# Patient Record
Sex: FEMALE | Race: WHITE
[De-identification: names, ages, dates, MRNs, and addresses within clinical notes are randomized per-mention and may not be internally consistent; named-entity substitution may affect disease eponyms.]

---

## 2017-07-19 ENCOUNTER — APPOINTMENT (OUTPATIENT)
Dept: VASCULAR SURGERY | Facility: CLINIC | Age: 72
End: 2017-07-19

## 2017-11-01 ENCOUNTER — APPOINTMENT (OUTPATIENT)
Dept: VASCULAR SURGERY | Facility: CLINIC | Age: 72
End: 2017-11-01
Payer: MEDICARE

## 2017-11-01 VITALS — DIASTOLIC BLOOD PRESSURE: 77 MMHG | SYSTOLIC BLOOD PRESSURE: 137 MMHG

## 2017-11-01 PROCEDURE — 99213 OFFICE O/P EST LOW 20 MIN: CPT | Mod: 25

## 2017-11-01 PROCEDURE — 93880 EXTRACRANIAL BILAT STUDY: CPT

## 2017-11-19 ENCOUNTER — FORM ENCOUNTER (OUTPATIENT)
Age: 72
End: 2017-11-19

## 2017-11-20 ENCOUNTER — OUTPATIENT (OUTPATIENT)
Dept: OUTPATIENT SERVICES | Facility: HOSPITAL | Age: 72
LOS: 1 days | End: 2017-11-20
Payer: MEDICARE

## 2017-11-20 DIAGNOSIS — Z98.51 TUBAL LIGATION STATUS: Chronic | ICD-10-CM

## 2017-11-20 DIAGNOSIS — Z98.89 OTHER SPECIFIED POSTPROCEDURAL STATES: Chronic | ICD-10-CM

## 2017-11-20 PROCEDURE — 71275 CT ANGIOGRAPHY CHEST: CPT | Mod: 26

## 2017-11-20 PROCEDURE — 71275 CT ANGIOGRAPHY CHEST: CPT

## 2017-11-20 PROCEDURE — 70498 CT ANGIOGRAPHY NECK: CPT

## 2017-11-20 PROCEDURE — 70498 CT ANGIOGRAPHY NECK: CPT | Mod: 26

## 2017-12-12 ENCOUNTER — INPATIENT (INPATIENT)
Facility: HOSPITAL | Age: 72
LOS: 6 days | Discharge: HOME | DRG: 35 | End: 2017-12-19
Attending: SURGERY | Admitting: SURGERY
Payer: MEDICARE

## 2017-12-12 ENCOUNTER — APPOINTMENT (OUTPATIENT)
Dept: VASCULAR SURGERY | Facility: HOSPITAL | Age: 72
End: 2017-12-12

## 2017-12-12 VITALS
OXYGEN SATURATION: 100 % | SYSTOLIC BLOOD PRESSURE: 242 MMHG | RESPIRATION RATE: 18 BRPM | HEART RATE: 114 BPM | HEIGHT: 62.5 IN | WEIGHT: 119.71 LBS | TEMPERATURE: 98 F | DIASTOLIC BLOOD PRESSURE: 108 MMHG

## 2017-12-12 DIAGNOSIS — Z98.89 OTHER SPECIFIED POSTPROCEDURAL STATES: Chronic | ICD-10-CM

## 2017-12-12 DIAGNOSIS — Z98.51 TUBAL LIGATION STATUS: Chronic | ICD-10-CM

## 2017-12-12 DIAGNOSIS — Z98.890 OTHER SPECIFIED POSTPROCEDURAL STATES: Chronic | ICD-10-CM

## 2017-12-12 LAB
ANION GAP SERPL CALC-SCNC: 12 MMOL/L — SIGNIFICANT CHANGE UP (ref 5–17)
APTT BLD: 28.4 SEC — SIGNIFICANT CHANGE UP (ref 27.5–37.4)
BUN SERPL-MCNC: 22 MG/DL — SIGNIFICANT CHANGE UP (ref 7–23)
CALCIUM SERPL-MCNC: 9.5 MG/DL — SIGNIFICANT CHANGE UP (ref 8.4–10.5)
CHLORIDE SERPL-SCNC: 106 MMOL/L — SIGNIFICANT CHANGE UP (ref 96–108)
CO2 SERPL-SCNC: 22 MMOL/L — SIGNIFICANT CHANGE UP (ref 22–31)
CREAT SERPL-MCNC: 0.57 MG/DL — SIGNIFICANT CHANGE UP (ref 0.5–1.3)
GLUCOSE BLDC GLUCOMTR-MCNC: 107 MG/DL — HIGH (ref 70–99)
GLUCOSE BLDC GLUCOMTR-MCNC: 87 MG/DL — SIGNIFICANT CHANGE UP (ref 70–99)
GLUCOSE SERPL-MCNC: 97 MG/DL — SIGNIFICANT CHANGE UP (ref 70–99)
HCT VFR BLD CALC: 41.4 % — SIGNIFICANT CHANGE UP (ref 34.5–45)
HGB BLD-MCNC: 13.8 G/DL — SIGNIFICANT CHANGE UP (ref 11.5–15.5)
INR BLD: 0.97 — SIGNIFICANT CHANGE UP (ref 0.88–1.16)
MCHC RBC-ENTMCNC: 28.3 PG — SIGNIFICANT CHANGE UP (ref 27–34)
MCHC RBC-ENTMCNC: 33.3 G/DL — SIGNIFICANT CHANGE UP (ref 32–36)
MCV RBC AUTO: 84.8 FL — SIGNIFICANT CHANGE UP (ref 80–100)
PLATELET # BLD AUTO: 377 K/UL — SIGNIFICANT CHANGE UP (ref 150–400)
POTASSIUM SERPL-MCNC: 4.3 MMOL/L — SIGNIFICANT CHANGE UP (ref 3.5–5.3)
POTASSIUM SERPL-SCNC: 4.3 MMOL/L — SIGNIFICANT CHANGE UP (ref 3.5–5.3)
PROTHROM AB SERPL-ACNC: 10.8 SEC — SIGNIFICANT CHANGE UP (ref 9.8–12.7)
RBC # BLD: 4.88 M/UL — SIGNIFICANT CHANGE UP (ref 3.8–5.2)
RBC # FLD: 12.7 % — SIGNIFICANT CHANGE UP (ref 10.3–16.9)
SODIUM SERPL-SCNC: 140 MMOL/L — SIGNIFICANT CHANGE UP (ref 135–145)
TROPONIN T SERPL-MCNC: <0.01 NG/ML — SIGNIFICANT CHANGE UP (ref 0–0.01)
WBC # BLD: 16.8 K/UL — HIGH (ref 3.8–10.5)
WBC # FLD AUTO: 16.8 K/UL — HIGH (ref 3.8–10.5)

## 2017-12-12 PROCEDURE — 99282 EMERGENCY DEPT VISIT SF MDM: CPT | Mod: GC

## 2017-12-12 PROCEDURE — 93010 ELECTROCARDIOGRAM REPORT: CPT

## 2017-12-12 PROCEDURE — 99291 CRITICAL CARE FIRST HOUR: CPT

## 2017-12-12 PROCEDURE — 70496 CT ANGIOGRAPHY HEAD: CPT | Mod: 26

## 2017-12-12 PROCEDURE — 70498 CT ANGIOGRAPHY NECK: CPT | Mod: 26

## 2017-12-12 PROCEDURE — 75605 CONTRAST EXAM THORACIC AORTA: CPT | Mod: 26,59,GC

## 2017-12-12 PROCEDURE — 36200 PLACE CATHETER IN AORTA: CPT | Mod: 59,GC

## 2017-12-12 PROCEDURE — 36215 PLACE CATHETER IN ARTERY: CPT | Mod: 59,GC

## 2017-12-12 PROCEDURE — 37218 STENT PLACEMT ANTE CAROTID: CPT | Mod: LT,GC

## 2017-12-12 RX ORDER — INSULIN LISPRO 100/ML
VIAL (ML) SUBCUTANEOUS
Qty: 0 | Refills: 0 | Status: DISCONTINUED | OUTPATIENT
Start: 2017-12-12 | End: 2017-12-19

## 2017-12-12 RX ORDER — GLUCAGON INJECTION, SOLUTION 0.5 MG/.1ML
1 INJECTION, SOLUTION SUBCUTANEOUS ONCE
Qty: 0 | Refills: 0 | Status: DISCONTINUED | OUTPATIENT
Start: 2017-12-12 | End: 2017-12-19

## 2017-12-12 RX ORDER — DEXTROSE 50 % IN WATER 50 %
1 SYRINGE (ML) INTRAVENOUS ONCE
Qty: 0 | Refills: 0 | Status: DISCONTINUED | OUTPATIENT
Start: 2017-12-12 | End: 2017-12-19

## 2017-12-12 RX ORDER — HYDRALAZINE HCL 50 MG
10 TABLET ORAL ONCE
Qty: 0 | Refills: 0 | Status: COMPLETED | OUTPATIENT
Start: 2017-12-12 | End: 2017-12-12

## 2017-12-12 RX ORDER — AMLODIPINE BESYLATE 2.5 MG/1
5 TABLET ORAL DAILY
Qty: 0 | Refills: 0 | Status: DISCONTINUED | OUTPATIENT
Start: 2017-12-12 | End: 2017-12-19

## 2017-12-12 RX ORDER — LABETALOL HCL 100 MG
20 TABLET ORAL ONCE
Qty: 0 | Refills: 0 | Status: COMPLETED | OUTPATIENT
Start: 2017-12-12 | End: 2017-12-12

## 2017-12-12 RX ORDER — SODIUM CHLORIDE 9 MG/ML
1000 INJECTION, SOLUTION INTRAVENOUS
Qty: 0 | Refills: 0 | Status: DISCONTINUED | OUTPATIENT
Start: 2017-12-12 | End: 2017-12-19

## 2017-12-12 RX ORDER — CHLORHEXIDINE GLUCONATE 213 G/1000ML
1 SOLUTION TOPICAL ONCE
Qty: 0 | Refills: 0 | Status: DISCONTINUED | OUTPATIENT
Start: 2017-12-12 | End: 2017-12-14

## 2017-12-12 RX ORDER — EPTIFIBATIDE 2 MG/ML
0.5 INJECTION, SOLUTION INTRAVENOUS
Qty: 75 | Refills: 0 | Status: DISCONTINUED | OUTPATIENT
Start: 2017-12-12 | End: 2017-12-13

## 2017-12-12 RX ORDER — METOPROLOL TARTRATE 50 MG
25 TABLET ORAL
Qty: 0 | Refills: 0 | Status: DISCONTINUED | OUTPATIENT
Start: 2017-12-12 | End: 2017-12-13

## 2017-12-12 RX ORDER — LEVOTHYROXINE SODIUM 125 MCG
37.5 TABLET ORAL DAILY
Qty: 0 | Refills: 0 | Status: DISCONTINUED | OUTPATIENT
Start: 2017-12-12 | End: 2017-12-12

## 2017-12-12 RX ORDER — HEPARIN SODIUM 5000 [USP'U]/ML
5000 INJECTION INTRAVENOUS; SUBCUTANEOUS ONCE
Qty: 0 | Refills: 0 | Status: COMPLETED | OUTPATIENT
Start: 2017-12-12 | End: 2017-12-12

## 2017-12-12 RX ORDER — LEVOTHYROXINE SODIUM 125 MCG
75 TABLET ORAL DAILY
Qty: 0 | Refills: 0 | Status: DISCONTINUED | OUTPATIENT
Start: 2017-12-12 | End: 2017-12-19

## 2017-12-12 RX ORDER — CLOPIDOGREL BISULFATE 75 MG/1
300 TABLET, FILM COATED ORAL ONCE
Qty: 0 | Refills: 0 | Status: COMPLETED | OUTPATIENT
Start: 2017-12-12 | End: 2017-12-12

## 2017-12-12 RX ORDER — HEPARIN SODIUM 5000 [USP'U]/ML
1000 INJECTION INTRAVENOUS; SUBCUTANEOUS
Qty: 25000 | Refills: 0 | Status: DISCONTINUED | OUTPATIENT
Start: 2017-12-12 | End: 2017-12-12

## 2017-12-12 RX ORDER — DEXTROSE 50 % IN WATER 50 %
25 SYRINGE (ML) INTRAVENOUS ONCE
Qty: 0 | Refills: 0 | Status: DISCONTINUED | OUTPATIENT
Start: 2017-12-12 | End: 2017-12-19

## 2017-12-12 RX ORDER — EPTIFIBATIDE 2 MG/ML
0.5 INJECTION, SOLUTION INTRAVENOUS
Qty: 75 | Refills: 0 | Status: DISCONTINUED | OUTPATIENT
Start: 2017-12-12 | End: 2017-12-12

## 2017-12-12 RX ORDER — ONDANSETRON 8 MG/1
4 TABLET, FILM COATED ORAL EVERY 6 HOURS
Qty: 0 | Refills: 0 | Status: DISCONTINUED | OUTPATIENT
Start: 2017-12-12 | End: 2017-12-19

## 2017-12-12 RX ORDER — SODIUM CHLORIDE 9 MG/ML
1000 INJECTION INTRAMUSCULAR; INTRAVENOUS; SUBCUTANEOUS
Qty: 0 | Refills: 0 | Status: DISCONTINUED | OUTPATIENT
Start: 2017-12-12 | End: 2017-12-14

## 2017-12-12 RX ORDER — DEXTROSE 50 % IN WATER 50 %
12.5 SYRINGE (ML) INTRAVENOUS ONCE
Qty: 0 | Refills: 0 | Status: DISCONTINUED | OUTPATIENT
Start: 2017-12-12 | End: 2017-12-19

## 2017-12-12 RX ADMIN — AMLODIPINE BESYLATE 5 MILLIGRAM(S): 2.5 TABLET ORAL at 22:11

## 2017-12-12 RX ADMIN — Medication 10 MILLIGRAM(S): at 21:50

## 2017-12-12 RX ADMIN — CLOPIDOGREL BISULFATE 300 MILLIGRAM(S): 75 TABLET, FILM COATED ORAL at 19:34

## 2017-12-12 RX ADMIN — EPTIFIBATIDE 2.16 MICROGRAM(S)/KG/MIN: 2 INJECTION, SOLUTION INTRAVENOUS at 20:23

## 2017-12-12 RX ADMIN — SODIUM CHLORIDE 60 MILLILITER(S): 9 INJECTION INTRAMUSCULAR; INTRAVENOUS; SUBCUTANEOUS at 19:34

## 2017-12-12 RX ADMIN — Medication 20 MILLIGRAM(S): at 19:36

## 2017-12-12 RX ADMIN — HEPARIN SODIUM 5000 UNIT(S): 5000 INJECTION INTRAVENOUS; SUBCUTANEOUS at 19:37

## 2017-12-12 RX ADMIN — Medication 25 MILLIGRAM(S): at 22:11

## 2017-12-12 RX ADMIN — Medication 20 MILLIGRAM(S): at 19:32

## 2017-12-12 NOTE — PROVIDER CONTACT NOTE (CHANGE IN STATUS NOTIFICATION) - ASSESSMENT
patient unable to transfer from stretcher to bed right leg heaviness and right arm numbness, elevated Blood pressure and FS 87

## 2017-12-12 NOTE — PROVIDER CONTACT NOTE (CHANGE IN STATUS NOTIFICATION) - SITUATION
Patient having numbness of the right arm and right leg heaviness on the stretcher from the cath lab, Vascular team made aware and page  notifying Dr. Reed

## 2017-12-12 NOTE — CONSULT NOTE ADULT - SUBJECTIVE AND OBJECTIVE BOX
**STROKE CODE CONSULT NOTE**    Last known well time/Time of onset of symptoms:    HPI:    PAST MEDICAL & SURGICAL HISTORY:  Shingles  Hypothyroidism  Carotid stenosis, right  Gastritis  High cholesterol  Vitamin D deficiency  HTN (hypertension)  Carotid stenosis, left  S/P carotid endarterectomy: right: 3/2014, left: 4/2016  History of hemorrhoidectomy  History of tubal ligation      FAMILY HISTORY:  No pertinent family history in first degree relatives      SOCIAL HISTORY:  Smoking Cesation: Discussed    ROS:  Constitutional: No fever, weight loss or fatigue  Eyes: No eye pain, visual disturbances, or discharge  ENMT:  No difficulty hearing, tinnitus, vertigo; No sinus or throat pain  Neck: No pain or stiffness  Respiratory: No cough, wheezing, chills or hemoptysis  Cardiovascular: No chest pain, palpitations, shortness of breath, dizziness or leg swelling  Gastrointestinal: No abdominal pain. No nausea, vomiting or hematemesis; No diarrhea or constipation. Nohematochezia.  Genitourinary: No dysuria, frequency, hematuria or incontinence  Neurological: As per HPI  Skin: No itching, burning, rashes or lesions   Endocrine: No heat or cold intolerance; No hair loss  Musculoskeletal: No joint pain or swelling; No muscle, back or extremity pain  Psychiatric: No depression, anxiety, mood swings or difficulty sleeping  Heme/Lymph: No easy bruising or bleeding gums    MEDICATIONS  (STANDING):  amLODIPine   Tablet 5 milliGRAM(s) Oral daily  aspirin  chewable 81 milliGRAM(s) Oral daily  atorvastatin 80 milliGRAM(s) Oral at bedtime  chlorhexidine 4% Liquid 1 Application(s) Topical once  dextrose 5%. 1000 milliLiter(s) (50 mL/Hr) IV Continuous <Continuous>  dextrose 50% Injectable 12.5 Gram(s) IV Push once  dextrose 50% Injectable 25 Gram(s) IV Push once  dextrose 50% Injectable 25 Gram(s) IV Push once  insulin lispro (HumaLOG) corrective regimen sliding scale   SubCutaneous Before meals and at bedtime  levothyroxine 75 MICROGram(s) Oral daily  sodium chloride 0.9%. 1000 milliLiter(s) (60 mL/Hr) IV Continuous <Continuous>    MEDICATIONS  (PRN):  dextrose Gel 1 Dose(s) Oral once PRN Blood Glucose LESS THAN 70 milliGRAM(s)/deciliter  glucagon  Injectable 1 milliGRAM(s) IntraMuscular once PRN Glucose LESS THAN 70 milligrams/deciliter  ondansetron Injectable 4 milliGRAM(s) IV Push every 6 hours PRN Nausea and/or Vomiting      Allergies    No Known Allergies    Intolerances        Vital Signs Last 24 Hrs  T(C): 36.6 (13 Dec 2017 17:57), Max: 36.6 (12 Dec 2017 23:19)  T(F): 97.8 (13 Dec 2017 17:57), Max: 97.9 (12 Dec 2017 23:19)  HR: 72 (13 Dec 2017 20:00) (62 - 94)  BP: 149/59 (13 Dec 2017 20:00) (117/60 - 211/72)  BP(mean): 88 (13 Dec 2017 20:00) (79 - 121)  RR: 19 (13 Dec 2017 20:00) (13 - 34)  SpO2: 90% (13 Dec 2017 20:00) (90% - 100%)    PHYSICAL EXAM:  Constitutional: WDWN; NAD  Cardiovascular: RRR, no appreciable murmurs; no carotid bruits  Neurologic:  Mental status: Awake, alert and oriented x3.  Recent and remote memory intact.  Naming, repetition and comprehension intact.  Attention/concentration intact.  No dysarthria, no aphasia.  Fund of knowledge appropriate.    Cranial nerves: Fundoscopic exam demonstrated no abnormalities, pupils equally round and reactive to light, visual fields full, no nystagmus, extraocular muscles intact, V1 through V3 intact bilaterally and symmetric, face symmetric, hearing intact to finger rub, palate elevation symmetric, tongue was midline, sternocleidomastoid/shoulder shrug strength bilaterally 5/5.    Motor:  Normal bulk and tone, strength 5/5 in bilateral upper and lower extremities.   strength 5/5.  Rapid alternating movements intact and symmetric.   Sensation: Intact to light touch, proprioception, and pinprick.  No neglect.   Coordination: No dysmetria on finger-to-nose and heel-to-shin.  No clumsiness.  Reflexes: 2+ in upper and lower extremities, downgoing toes bilaterally  Gait: Narrow and steady. No ataxia.  Romberg negative    NIHSS:    Fingerstick Blood Glucose: CAPILLARY BLOOD GLUCOSE  87 (12 Dec 2017 20:24)      POCT Blood Glucose.: 241 mg/dL (13 Dec 2017 16:58)       LABS:                        12.9   13.0  )-----------( 324      ( 13 Dec 2017 06:16 )             37.4     12-13    137  |  104  |  15  ----------------------------<  97  4.2   |  17<L>  |  0.40<L>    Ca    8.7      13 Dec 2017 06:16  Phos  3.3     12-13  Mg     2.1     12-13      PT/INR - ( 13 Dec 2017 06:16 )   PT: 10.7 sec;   INR: 0.96          PTT - ( 13 Dec 2017 06:16 )  PTT:26.5 sec  CARDIAC MARKERS ( 12 Dec 2017 18:22 )  x     / <0.01 ng/mL / x     / x     / x              RADIOLOGY & ADDITIONAL STUDIES:    IV-tPA (Y/N):                                   Bolus time:  Reason IV-tPA not given:    ASSESSMENT/PLAN: **STROKE CODE CONSULT NOTE**    Last known well time/Time of onset of symptoms: 12/12 at 17:55    HPI:      PAST MEDICAL & SURGICAL HISTORY:  Shingles  Hypothyroidism  Carotid stenosis, right  Gastritis  High cholesterol  Vitamin D deficiency  HTN (hypertension)  Carotid stenosis, left  S/P carotid endarterectomy: right: 3/2014, left: 4/2016  History of hemorrhoidectomy  History of tubal ligation      FAMILY HISTORY:  No pertinent family history in first degree relatives      SOCIAL HISTORY:  Smoking Cesation: Discussed    ROS:  Constitutional: No fever, weight loss or fatigue  Eyes: No eye pain, visual disturbances, or discharge  ENMT:  No difficulty hearing, tinnitus, vertigo; No sinus or throat pain  Neck: No pain or stiffness  Respiratory: No cough, wheezing, chills or hemoptysis  Cardiovascular: No chest pain, palpitations, shortness of breath, dizziness or leg swelling  Gastrointestinal: No abdominal pain. No nausea, vomiting or hematemesis; No diarrhea or constipation. Nohematochezia.  Genitourinary: No dysuria, frequency, hematuria or incontinence  Neurological: As per HPI  Skin: No itching, burning, rashes or lesions   Endocrine: No heat or cold intolerance; No hair loss  Musculoskeletal: No joint pain or swelling; No muscle, back or extremity pain  Psychiatric: No depression, anxiety, mood swings or difficulty sleeping  Heme/Lymph: No easy bruising or bleeding gums    MEDICATIONS  (STANDING):  amLODIPine   Tablet 5 milliGRAM(s) Oral daily  aspirin  chewable 81 milliGRAM(s) Oral daily  atorvastatin 80 milliGRAM(s) Oral at bedtime  chlorhexidine 4% Liquid 1 Application(s) Topical once  dextrose 5%. 1000 milliLiter(s) (50 mL/Hr) IV Continuous <Continuous>  dextrose 50% Injectable 12.5 Gram(s) IV Push once  dextrose 50% Injectable 25 Gram(s) IV Push once  dextrose 50% Injectable 25 Gram(s) IV Push once  insulin lispro (HumaLOG) corrective regimen sliding scale   SubCutaneous Before meals and at bedtime  levothyroxine 75 MICROGram(s) Oral daily  sodium chloride 0.9%. 1000 milliLiter(s) (60 mL/Hr) IV Continuous <Continuous>    MEDICATIONS  (PRN):  dextrose Gel 1 Dose(s) Oral once PRN Blood Glucose LESS THAN 70 milliGRAM(s)/deciliter  glucagon  Injectable 1 milliGRAM(s) IntraMuscular once PRN Glucose LESS THAN 70 milligrams/deciliter  ondansetron Injectable 4 milliGRAM(s) IV Push every 6 hours PRN Nausea and/or Vomiting      Allergies    No Known Allergies    Intolerances        Vital Signs Last 24 Hrs  T(C): 36.6 (13 Dec 2017 17:57), Max: 36.6 (12 Dec 2017 23:19)  T(F): 97.8 (13 Dec 2017 17:57), Max: 97.9 (12 Dec 2017 23:19)  HR: 72 (13 Dec 2017 20:00) (62 - 94)  BP: 149/59 (13 Dec 2017 20:00) (117/60 - 211/72)  BP(mean): 88 (13 Dec 2017 20:00) (79 - 121)  RR: 19 (13 Dec 2017 20:00) (13 - 34)  SpO2: 90% (13 Dec 2017 20:00) (90% - 100%)    PHYSICAL EXAM:  Constitutional: WDWN; NAD  Cardiovascular: RRR, no appreciable murmurs; no carotid bruits  Neurologic:  Mental status: Awake, alert and oriented x3.  Recent and remote memory intact.  Naming, repetition and comprehension intact.  Attention/concentration intact.  No dysarthria, no aphasia.  Fund of knowledge appropriate.    Cranial nerves: Fundoscopic exam demonstrated no abnormalities, pupils equally round and reactive to light, visual fields full, no nystagmus, extraocular muscles intact, V1 through V3 intact bilaterally and symmetric, face symmetric, hearing intact to finger rub, palate elevation symmetric, tongue was midline, sternocleidomastoid/shoulder shrug strength bilaterally 5/5.    Motor:  Normal bulk and tone, strength 5/5 in bilateral upper and lower extremities.   strength 5/5.  Rapid alternating movements intact and symmetric.   Sensation: Intact to light touch, proprioception, and pinprick.  No neglect.   Coordination: No dysmetria on finger-to-nose and heel-to-shin.  No clumsiness.  Reflexes: 2+ in upper and lower extremities, downgoing toes bilaterally  Gait: Narrow and steady. No ataxia.  Romberg negative    NIHSS:    Fingerstick Blood Glucose: CAPILLARY BLOOD GLUCOSE  87 (12 Dec 2017 20:24)      POCT Blood Glucose.: 241 mg/dL (13 Dec 2017 16:58)       LABS:                        12.9   13.0  )-----------( 324      ( 13 Dec 2017 06:16 )             37.4     12-13    137  |  104  |  15  ----------------------------<  97  4.2   |  17<L>  |  0.40<L>    Ca    8.7      13 Dec 2017 06:16  Phos  3.3     12-13  Mg     2.1     12-13      PT/INR - ( 13 Dec 2017 06:16 )   PT: 10.7 sec;   INR: 0.96          PTT - ( 13 Dec 2017 06:16 )  PTT:26.5 sec  CARDIAC MARKERS ( 12 Dec 2017 18:22 )  x     / <0.01 ng/mL / x     / x     / x              RADIOLOGY & ADDITIONAL STUDIES:    IV-tPA (Y/N):                                   Bolus time:  Reason IV-tPA not given:    ASSESSMENT/PLAN: **STROKE CODE CONSULT NOTE**    Last known well time/Time of onset of symptoms: 12/12 at 17:55    HPI:  73 y/o F with PMHx of HTN, HLD, hypothyroidism, b/l carotid stenosis s/p R CEA (3/2014) and L CEA (4/2016), restenosis of L carotid artery s/p carotid stent 12/12, now developing right-sided hemiplegia post-procedure. Stroke code was called on 12/12 at 18:05 for acute right-sided hemiplegia. Patient states there was heaviness in her R arm and leg, preventing her from lifting. However, is able to wiggle fingers and toes. Denies loss of sensation, confusion, vision change, CP, SOB, lightheadedness, palpitations, cough, URI symptoms, N/V/D/C, abdominal pain, dysuria, hematuria, changes in bowel habits, LE edema.     PAST MEDICAL & SURGICAL HISTORY:  Shingles  Hypothyroidism  Carotid stenosis, right  Gastritis  High cholesterol  Vitamin D deficiency  HTN (hypertension)  Carotid stenosis, left  S/P carotid endarterectomy: right: 3/2014, left: 4/2016  History of hemorrhoidectomy  History of tubal ligation    MEDICATIONS  (STANDING):  amLODIPine   Tablet 5 milliGRAM(s) Oral daily  aspirin  chewable 81 milliGRAM(s) Oral daily  atorvastatin 80 milliGRAM(s) Oral at bedtime  chlorhexidine 4% Liquid 1 Application(s) Topical once  dextrose 5%. 1000 milliLiter(s) (50 mL/Hr) IV Continuous <Continuous>  dextrose 50% Injectable 12.5 Gram(s) IV Push once  dextrose 50% Injectable 25 Gram(s) IV Push once  dextrose 50% Injectable 25 Gram(s) IV Push once  insulin lispro (HumaLOG) corrective regimen sliding scale   SubCutaneous Before meals and at bedtime  levothyroxine 75 MICROGram(s) Oral daily  sodium chloride 0.9%. 1000 milliLiter(s) (60 mL/Hr) IV Continuous <Continuous>    MEDICATIONS  (PRN):  dextrose Gel 1 Dose(s) Oral once PRN Blood Glucose LESS THAN 70 milliGRAM(s)/deciliter  glucagon  Injectable 1 milliGRAM(s) IntraMuscular once PRN Glucose LESS THAN 70 milligrams/deciliter  ondansetron Injectable 4 milliGRAM(s) IV Push every 6 hours PRN Nausea and/or Vomiting      Allergies  No Known Allergies    Vital Signs Last 24 Hrs  T(C): 36.6 (12 Dec 2017 18:08), Max: 36.6 (12 Dec 2017 18:08)  T(F): 97.8 (12 Dec 2017 18:08), Max: 97.8 (12 Dec 2017 18:08)  HR: 72 (12 Dec 2017 19:56) (70 - 114)  BP: 174/80 (12 Dec 2017 19:56) (174/80 - 242/108)  BP(mean): --  RR: 18 (12 Dec 2017 19:56) (18 - 18)  SpO2: 99% (12 Dec 2017 19:56) (99% - 100%)    PHYSICAL EXAM:  Constitutional: WDWN; anxious, AOx3  Cardiovascular: RRR, no appreciable murmurs; no carotid bruits  Neurologic:  Mental status: Awake, alert and oriented x3.  Recent and remote memory intact.  Naming, repetition and comprehension intact.  Attention/concentration intact.  No dysarthria, no aphasia.  Fund of knowledge appropriate.    Cranial nerves: PERRL, visual fields full, no nystagmus, extraocular muscles intact, V1 through V3 intact bilaterally and symmetric, face symmetric, hearing intact to finger rub, palate elevation symmetric, tongue was midline, sternocleidomastoid/shoulder shrug strength bilaterally 5/5.    Motor:  Normal bulk and tone, proximal RUE 1/5, R hand  3/5, RLE 1/5. LUE and LLE 5/5. L hand  5/5. Rapid alternating movements intact and symmetric.   Sensation: Intact to light touch, proprioception, and pinprick.  No neglect.   Coordination: No dysmetria on finger-to-nose and heel-to-shin.  No clumsiness.  Reflexes: 2+ in upper and lower extremities, downgoing toes bilaterally  Gait: Narrow and steady. No ataxia.  Romberg negative    NIHSS: 6    Fingerstick Blood Glucose: CAPILLARY BLOOD GLUCOSE  87 (12 Dec 2017 20:24)     LABS:                        13.8   16.8  )-----------( 377      ( 12 Dec 2017 18:22 )             41.4     12-12    140  |  106  |  22  ----------------------------<  97  4.3   |  22  |  0.57    Ca    9.5      12 Dec 2017 18:22      PT/INR - ( 12 Dec 2017 18:22 )   PT: 10.8 sec;   INR: 0.97          PTT - ( 12 Dec 2017 18:22 )  PTT:28.4 sec    RADIOLOGY & ADDITIONAL STUDIES:    IV-tPA (Y/N):  N                            Reason IV-tPA not given: patient given heparin bolus at 18:05.

## 2017-12-12 NOTE — H&P ADULT - PSH
History of hemorrhoidectomy    History of tubal ligation    S/P carotid endarterectomy  right: 3/2014, left: 4/2016

## 2017-12-12 NOTE — CONSULT NOTE ADULT - ASSESSMENT
73 y/o F with PMHx of b/l carotid stenosis s/p R CEA (3/2014) and L CEA (4/2016), restenosis of L carotid artery s/p carotid stent 12/12, now developing right-sided hemiplegia post-procedure.     1. Right sided hemiplegia: Symptom onset post L carotid stent placement. Stroke code called, CT head negative for transcortical infarct or hemorrhage. CTA head/neck negative for stenosis, new L carotid stent patent post-procedure. tPA was not pushed as patient was given heparin bolus at time of symptom onset.  - Start integrelin gtt.  - Continue with plavix. Recommend aspirin 81 mg qd.  - Recommend atorvastatin 80mg qhs.  - Consider MRI Brain to assess for acute stroke.  - PT/OT evaluation 73 y/o F with PMHx of b/l carotid stenosis s/p R CEA (3/2014) and L CEA (4/2016), restenosis of L carotid artery s/p carotid stent 12/12, now developing right-sided hemiplegia post-procedure.     1. Right sided hemiplegia: Symptom onset post L carotid stent placement. Stroke code called, CT head negative for transcortical infarct or hemorrhage. CTA head/neck negative for stenosis, new L carotid stent patent post-procedure. tPA was not pushed as patient was given heparin bolus at time of symptom onset.  - Start integrelin gtt.  - Continue with plavix. Recommend aspirin 81 mg qd.  - Recommend atorvastatin 80mg qhs.  - Consider MRI Brain to assess for acute stroke.  - PT/OT evaluation    Case discussed with Dr. Reed, stroke attending, and with primary team.

## 2017-12-12 NOTE — H&P ADULT - PMH
Carotid stenosis, left    Carotid stenosis, right    Gastritis    High cholesterol    HTN (hypertension)    Hypothyroidism    Shingles    Vitamin D deficiency

## 2017-12-12 NOTE — H&P ADULT - ASSESSMENT
71 yo F with restenosis of left carotid s/p L CEA in 4/2016.    Plan:  - cath lab today for left carotid stent

## 2017-12-12 NOTE — H&P ADULT - NSHPPHYSICALEXAM_GEN_ALL_CORE
Pre op:  Gen: NAD, resting comfortably  HEENT: NC/AT, EOMI  Lungs: normal resp effort  Heart: NSR  Abd: soft, NT/ND  Extr: 5/5 strength x 4, no clubbing/cyanosis/edema  Neuro: A/O x 3, normal motor/sensation, CNs II-XII grossly intact, no deficits  skin: no rashes or lesions  Psych: normal

## 2017-12-12 NOTE — PROGRESS NOTE ADULT - SUBJECTIVE AND OBJECTIVE BOX
Vascular Surgery Post-Op Note    Procedure: L internal Carotid Stent     Diagnosis/Indication: L ICA stenosis     Surgeon: Dr Gloria    S: Pt has no complaints. Denies CP, SOB, calf tenderness.     O:  Vital Signs Last 24 Hrs  T(C): --  T(F): --  HR: --  BP: --  BP(mean): --  RR: --  SpO2: --T(C): --  T(F): --  HR: --  BP: --  RR: --  SpO2: --  Wt(kg): --    Pre Sheath Pull    Gen: NAD, resting comfortably in bed  C/V: NSR  Pulm: Nonlabored breathing, no respiratory distress  Abd: soft, NT/ND  Groin: no active bleeding, soft no hematoma, no ecchymosis,    Extrem: WWP, No edema, no wounds. 2+ PT BP. Biphasic DP BL Motor and sensory intact BL  Neuro: CN 2-12 intact. = sensation and motor BL.       Post Sheath Pull    Gen: NAD, resting comfortably in bed  C/V: NSR  Pulm: Nonlabored breathing, no respiratory distress  Abd: soft, NT/ND  Groin: no active bleeding, soft no hematoma, no ecchymosis,    Extrem: WWP, No edema, no wounds. 2+ PT BP. Biphasic DP BL Motor and sensory intact BL  Neuro: CN 2-12 intact. = sensation and motor BL.       A/P: 72yFemale s/p above procedure. 6 F Sheath pulled from R groin, direct pressure held for 20 minutes. While holding pressure Pt became hypotensive to SBP in 80's (from 120's)with HR increasing to 90's (from 60's) Pt placed in Trendelenburg and 250cc NS bolus with return of baseline VS. Physical exam unchanged.   Diet: NPO  IVF: NS @ 60  Pain/nausea control  Dispo plan: Tele

## 2017-12-12 NOTE — BRIEF OPERATIVE NOTE - PRE-OP DX
Carotid stenosis, asymptomatic, left  12/12/2017  s/p left carotid endarterectomy  Active  Ezequiel Vásquez

## 2017-12-12 NOTE — PROVIDER CONTACT NOTE (CHANGE IN STATUS NOTIFICATION) - ACTION/TREATMENT ORDERED:
Labetalol 20mg IV, PLavix 300mg, IV integrelin, heparin 5000 units and brain,neck with and without contrast

## 2017-12-12 NOTE — PROVIDER CONTACT NOTE (CHANGE IN STATUS NOTIFICATION) - BACKGROUND
patient on the stretcher from [facility] cath lab unable to transfer to her bed right leg heaviness and right arn numbness

## 2017-12-12 NOTE — PROVIDER CONTACT NOTE (CHANGE IN STATUS NOTIFICATION) - RECOMMENDATIONS
Labetalol 20 mg IV for elevated BP, 5000 units heparin, page  contacting Dr. Reed and head CT without contrast and CT with contrast of the brain and neck, IV integrelin,labs,plavix 300mg

## 2017-12-12 NOTE — H&P ADULT - HISTORY OF PRESENT ILLNESS
aa 73 yo F with HTN, HLD, hypothyroidism, and h/o bilateral carotid stenosis s/p R CEA (3/2014) and L CEA (4/2016), now found to have restenosis in left carotid artery seen on office duplex and CTA neck (>90% stenosis just beyond bifurcation).     She presents today for elective left carotid stent placement. Pre-op, no weakness, no paresthesias, no facial droop, no slurred speech. No fevers, no nausea, no CP/SOA.

## 2017-12-12 NOTE — BRIEF OPERATIVE NOTE - PROCEDURE
<<-----Click on this checkbox to enter Procedure Carotid stent placement  12/12/2017  left internal carotid artery  Active  GANAND

## 2017-12-12 NOTE — BRIEF OPERATIVE NOTE - OPERATION/FINDINGS
R CFA access with 5F sheath, upsized to 6F sheath. Deployed R CFA access with 5F sheath, upsized to 6F sheath. Deployed FilterWire EZ 3.5-5.5 mm into left internal carotid artery distal to stenosis. Then deployed Carotid WALLSTENT 8mm x 36mm. Post-stent dilated with Leobardo balloon 5.0mm x 30mm.

## 2017-12-12 NOTE — CONSULT NOTE ADULT - SUBJECTIVE AND OBJECTIVE BOX
HPI:  71 yo F with HTN, HLD, hypothyroidism, and h/o bilateral carotid stenosis s/p R CEA (3/2014) and L CEA (4/2016), now found to have restenosis in left carotid artery seen on office duplex and CTA neck (>90% stenosis just beyond bifurcation).     She presents today for elective left carotid stent placement. Pre-op, no weakness, no paresthesias, no facial droop, no slurred speech. No fevers, no nausea, no CP/SOA. (12 Dec 2017 20:29)      PAST MEDICAL & SURGICAL HISTORY:  Shingles  Hypothyroidism  Carotid stenosis, right  Gastritis  High cholesterol  Vitamin D deficiency  HTN (hypertension)  Carotid stenosis, left  S/P carotid endarterectomy: right: 3/2014, left: 4/2016  History of hemorrhoidectomy  History of tubal ligation      ROS: See HPI    MEDICATIONS  (STANDING):  amLODIPine   Tablet 5 milliGRAM(s) Oral daily  chlorhexidine 4% Liquid 1 Application(s) Topical once  dextrose 5%. 1000 milliLiter(s) (50 mL/Hr) IV Continuous <Continuous>  dextrose 50% Injectable 12.5 Gram(s) IV Push once  dextrose 50% Injectable 25 Gram(s) IV Push once  dextrose 50% Injectable 25 Gram(s) IV Push once  eptifibatide Infusion 75 mg/100 mL 0.5 MICROgram(s)/kG/Min (2.16 mL/Hr) IV Continuous <Continuous>  hydrALAZINE Injectable 10 milliGRAM(s) IV Push once  insulin lispro (HumaLOG) corrective regimen sliding scale   SubCutaneous Before meals and at bedtime  levothyroxine 75 MICROGram(s) Oral daily  metoprolol     tartrate 25 milliGRAM(s) Oral two times a day  sodium chloride 0.9%. 1000 milliLiter(s) (60 mL/Hr) IV Continuous <Continuous>    MEDICATIONS  (PRN):  dextrose Gel 1 Dose(s) Oral once PRN Blood Glucose LESS THAN 70 milliGRAM(s)/deciliter  glucagon  Injectable 1 milliGRAM(s) IntraMuscular once PRN Glucose LESS THAN 70 milligrams/deciliter      Allergies    No Known Allergies    Intolerances        SOCIAL HISTORY:  Smoke: Never Smoker  EtOH: occasional    FAMILY HISTORY:  No pertinent family history in first degree relatives      Vital Signs Last 24 Hrs  T(C): 36.6 (12 Dec 2017 18:08), Max: 36.6 (12 Dec 2017 18:08)  T(F): 97.8 (12 Dec 2017 18:08), Max: 97.8 (12 Dec 2017 18:08)  HR: 72 (12 Dec 2017 19:56) (70 - 114)  BP: 174/80 (12 Dec 2017 19:56) (174/80 - 242/108)  BP(mean): --  RR: 18 (12 Dec 2017 19:56) (18 - 18)  SpO2: 99% (12 Dec 2017 19:56) (99% - 100%)    PHYSICAL EXAM    Gen: NAD, AOx3    CV: RRR, no m/r/g  Pulm: CTAB  Abd: Soft, NT/ND  Ext:     LABS:                        13.8   16.8  )-----------( 377      ( 12 Dec 2017 18:22 )             41.4     12-12    140  |  106  |  22  ----------------------------<  97  4.3   |  22  |  0.57    Ca    9.5      12 Dec 2017 18:22      PT/INR - ( 12 Dec 2017 18:22 )   PT: 10.8 sec;   INR: 0.97          PTT - ( 12 Dec 2017 18:22 )  PTT:28.4 sec      RADIOLOGY & ADDITIONAL STUDIES:    Assessment and Plan:  71 yo F with HTN, HLD, hypothyroidism with h/o b/l carotid stenosis s/p R CEA 2014, L CEA 2016 with L carotid 90% restenosis s/p L ICA stent with ischemic stroke, R sided weakness post op    Neuro:integrillin gtt 0.5mcg/kg/hr  CV: amlodipine, metoprolol  Pulm: RA, IS  GI/FEN: NPO  : voids  ID: none  Endo: ISS, Synthroid  PPx: SCDs  Lines: PIV

## 2017-12-13 LAB
ANION GAP SERPL CALC-SCNC: 16 MMOL/L — SIGNIFICANT CHANGE UP (ref 5–17)
APTT BLD: 26.5 SEC — LOW (ref 27.5–37.4)
BASOPHILS NFR BLD AUTO: 0.2 % — SIGNIFICANT CHANGE UP (ref 0–2)
BUN SERPL-MCNC: 15 MG/DL — SIGNIFICANT CHANGE UP (ref 7–23)
CALCIUM SERPL-MCNC: 8.7 MG/DL — SIGNIFICANT CHANGE UP (ref 8.4–10.5)
CHLORIDE SERPL-SCNC: 104 MMOL/L — SIGNIFICANT CHANGE UP (ref 96–108)
CHOLEST SERPL-MCNC: 191 MG/DL — SIGNIFICANT CHANGE UP (ref 10–199)
CO2 SERPL-SCNC: 17 MMOL/L — LOW (ref 22–31)
CREAT SERPL-MCNC: 0.4 MG/DL — LOW (ref 0.5–1.3)
EOSINOPHIL NFR BLD AUTO: 0.5 % — SIGNIFICANT CHANGE UP (ref 0–6)
GLUCOSE BLDC GLUCOMTR-MCNC: 109 MG/DL — HIGH (ref 70–99)
GLUCOSE BLDC GLUCOMTR-MCNC: 189 MG/DL — HIGH (ref 70–99)
GLUCOSE BLDC GLUCOMTR-MCNC: 241 MG/DL — HIGH (ref 70–99)
GLUCOSE BLDC GLUCOMTR-MCNC: 86 MG/DL — SIGNIFICANT CHANGE UP (ref 70–99)
GLUCOSE SERPL-MCNC: 97 MG/DL — SIGNIFICANT CHANGE UP (ref 70–99)
HBA1C BLD-MCNC: 5.8 % — HIGH (ref 4–5.6)
HCT VFR BLD CALC: 37.4 % — SIGNIFICANT CHANGE UP (ref 34.5–45)
HDLC SERPL-MCNC: 47 MG/DL — SIGNIFICANT CHANGE UP (ref 40–125)
HGB BLD-MCNC: 12.9 G/DL — SIGNIFICANT CHANGE UP (ref 11.5–15.5)
INR BLD: 0.96 — SIGNIFICANT CHANGE UP (ref 0.88–1.16)
LIPID PNL WITH DIRECT LDL SERPL: 110 MG/DL — SIGNIFICANT CHANGE UP
LYMPHOCYTES # BLD AUTO: 23.3 % — SIGNIFICANT CHANGE UP (ref 13–44)
MAGNESIUM SERPL-MCNC: 2.1 MG/DL — SIGNIFICANT CHANGE UP (ref 1.6–2.6)
MCHC RBC-ENTMCNC: 29 PG — SIGNIFICANT CHANGE UP (ref 27–34)
MCHC RBC-ENTMCNC: 34.5 G/DL — SIGNIFICANT CHANGE UP (ref 32–36)
MCV RBC AUTO: 84 FL — SIGNIFICANT CHANGE UP (ref 80–100)
MONOCYTES NFR BLD AUTO: 8.4 % — SIGNIFICANT CHANGE UP (ref 2–14)
NEUTROPHILS NFR BLD AUTO: 67.6 % — SIGNIFICANT CHANGE UP (ref 43–77)
PHOSPHATE SERPL-MCNC: 3.3 MG/DL — SIGNIFICANT CHANGE UP (ref 2.5–4.5)
PLATELET # BLD AUTO: 324 K/UL — SIGNIFICANT CHANGE UP (ref 150–400)
POTASSIUM SERPL-MCNC: 4.2 MMOL/L — SIGNIFICANT CHANGE UP (ref 3.5–5.3)
POTASSIUM SERPL-SCNC: 4.2 MMOL/L — SIGNIFICANT CHANGE UP (ref 3.5–5.3)
PROTHROM AB SERPL-ACNC: 10.7 SEC — SIGNIFICANT CHANGE UP (ref 9.8–12.7)
RBC # BLD: 4.45 M/UL — SIGNIFICANT CHANGE UP (ref 3.8–5.2)
RBC # FLD: 12.7 % — SIGNIFICANT CHANGE UP (ref 10.3–16.9)
SODIUM SERPL-SCNC: 137 MMOL/L — SIGNIFICANT CHANGE UP (ref 135–145)
TOTAL CHOLESTEROL/HDL RATIO MEASUREMENT: 4.1 RATIO — SIGNIFICANT CHANGE UP (ref 3.3–7.1)
TRIGL SERPL-MCNC: 172 MG/DL — HIGH (ref 10–149)
WBC # BLD: 13 K/UL — HIGH (ref 3.8–10.5)
WBC # FLD AUTO: 13 K/UL — HIGH (ref 3.8–10.5)

## 2017-12-13 PROCEDURE — 99233 SBSQ HOSP IP/OBS HIGH 50: CPT

## 2017-12-13 PROCEDURE — 99233 SBSQ HOSP IP/OBS HIGH 50: CPT | Mod: GC

## 2017-12-13 PROCEDURE — 70551 MRI BRAIN STEM W/O DYE: CPT | Mod: 26

## 2017-12-13 RX ORDER — ATORVASTATIN CALCIUM 80 MG/1
80 TABLET, FILM COATED ORAL AT BEDTIME
Qty: 0 | Refills: 0 | Status: DISCONTINUED | OUTPATIENT
Start: 2017-12-13 | End: 2017-12-19

## 2017-12-13 RX ORDER — CLOPIDOGREL BISULFATE 75 MG/1
75 TABLET, FILM COATED ORAL DAILY
Qty: 0 | Refills: 0 | Status: DISCONTINUED | OUTPATIENT
Start: 2017-12-14 | End: 2017-12-19

## 2017-12-13 RX ORDER — CLOPIDOGREL BISULFATE 75 MG/1
75 TABLET, FILM COATED ORAL ONCE
Qty: 0 | Refills: 0 | Status: COMPLETED | OUTPATIENT
Start: 2017-12-13 | End: 2017-12-13

## 2017-12-13 RX ORDER — ASPIRIN/CALCIUM CARB/MAGNESIUM 324 MG
81 TABLET ORAL DAILY
Qty: 0 | Refills: 0 | Status: DISCONTINUED | OUTPATIENT
Start: 2017-12-13 | End: 2017-12-19

## 2017-12-13 RX ORDER — LABETALOL HCL 100 MG
10 TABLET ORAL
Qty: 0 | Refills: 0 | Status: DISCONTINUED | OUTPATIENT
Start: 2017-12-13 | End: 2017-12-19

## 2017-12-13 RX ADMIN — AMLODIPINE BESYLATE 5 MILLIGRAM(S): 2.5 TABLET ORAL at 07:05

## 2017-12-13 RX ADMIN — Medication 10 MILLIGRAM(S): at 21:15

## 2017-12-13 RX ADMIN — CLOPIDOGREL BISULFATE 75 MILLIGRAM(S): 75 TABLET, FILM COATED ORAL at 07:05

## 2017-12-13 RX ADMIN — Medication 75 MICROGRAM(S): at 07:05

## 2017-12-13 RX ADMIN — Medication 2: at 11:35

## 2017-12-13 RX ADMIN — ATORVASTATIN CALCIUM 80 MILLIGRAM(S): 80 TABLET, FILM COATED ORAL at 21:13

## 2017-12-13 RX ADMIN — Medication 4: at 17:04

## 2017-12-13 RX ADMIN — Medication 81 MILLIGRAM(S): at 16:56

## 2017-12-13 RX ADMIN — SODIUM CHLORIDE 60 MILLILITER(S): 9 INJECTION INTRAMUSCULAR; INTRAVENOUS; SUBCUTANEOUS at 15:18

## 2017-12-13 NOTE — SWALLOW BEDSIDE ASSESSMENT ADULT - SPECIFY REASON(S)
Patient is a 72 y.o female s/p elective (L) ICA stent with ischemic CVA< (R) sided weakness post-op.

## 2017-12-13 NOTE — PROGRESS NOTE ADULT - ASSESSMENT
73 yo F with HTN, HLD, hypothyroidism with h/o b/l carotid stenosis s/p R CEA 2014, L CEA 2016 with L carotid 90% restenosis s/p L ICA stent with ischemic stroke, R sided weakness post op    Neuro: Neuro checks q4h; Zofran PRN  CV: Amlodipine 5, Plavix 75  Pulm: RA, IS  GI/FEN: NPO, NS@60  : Ratna  ID: None  Endo: ISS, Synthroid 75  Heme: Plavix 75  PPx: SCDs  Lines: PIVs  Activity: Bedrest

## 2017-12-13 NOTE — SWALLOW BEDSIDE ASSESSMENT ADULT - ADDITIONAL RECOMMENDATIONS
Patient received in bed.  Alert and oriented.  Denies dysphagia.  Speech and language functioning WNL.  OM exam revealed adequate ROM of all articulators.  Pt. accepted thins and a cracker.  Oral phase marked by adequate receipt, containment and clearance.  Swallow trigger appeared timely and laryngeal elevation adequate per palpation.  Pt. was without overt s/s of aspiration.  Pt. does not currently present with any further need for SLP intervention.  Spoke with PA, who is in agreement.  Please reconsult as needed.

## 2017-12-13 NOTE — PROGRESS NOTE ADULT - SUBJECTIVE AND OBJECTIVE BOX
Neurology Stroke Follow Up     Interval History:  Patient seen and examined.  She completed 12 hour course of Integrilin with Plavix 75mg given 1 hour prior to stopping the Integrilin.  No bleeding in urine or stool.  She started moving her right arm and leg overnight.  No sensory changes.  No speech or visual deficits.    Medications:  MEDICATIONS  (STANDING):  amLODIPine   Tablet 5 milliGRAM(s) Oral daily  atorvastatin 80 milliGRAM(s) Oral at bedtime  chlorhexidine 4% Liquid 1 Application(s) Topical once  insulin lispro (HumaLOG) corrective regimen sliding scale   SubCutaneous Before meals and at bedtime  levothyroxine 75 MICROGram(s) Oral daily  sodium chloride 0.9%. 1000 milliLiter(s) (60 mL/Hr) IV Continuous <Continuous>    MEDICATIONS  (PRN):  ondansetron Injectable 4 milliGRAM(s) IV Push every 6 hours PRN Nausea and/or Vomiting    Allergies  No Known Allergies    Exam:  Vital Signs Last 24 Hrs  T(C): 35.9 (13 Dec 2017 14:30), Max: 36.6 (12 Dec 2017 18:08)  T(F): 96.7 (13 Dec 2017 14:30), Max: 97.9 (12 Dec 2017 23:19)  HR: 72 (13 Dec 2017 16:00) (62 - 114)  BP: 143/67 (13 Dec 2017 16:00) (117/60 - 242/108)  BP(mean): 102 (13 Dec 2017 16:00) (79 - 118)  RR: 23 (13 Dec 2017 16:00) (13 - 34)  SpO2: 97% (13 Dec 2017 16:00) (95% - 100%)    Gen: Lying in bed, calm, cooperative, in NAD  CV: RRR  Extremities: 2+ radial pulses bilaterally  Neuro:  Mental status: Awake, alert and oriented x3. fluent, no dysarthria, follows all commands. Recent and remote memory intact. Attention/concentration intact.  fund of knowledge intact  Cranial nerves: Pupils equally round and reactive to light, visual fields full, no nystagmus, extraocular muscles intact, V1 through V3 intact bilaterally and symmetric, no facial droop, palate elevation symmetric, tongue was midline    Motor:  Normal bulk and tone, Right - She had trouble initiating movement of her right side but then 4-/5, Left 5/5     Sensation: Intact to light touch, temperature bilaterally   Coordination: No dysmetria on finger-to-nose bilaterally   Reflexes: 2+ in upper and lower extremities, absent Babinski bilaterally  Gait: deferred    Labs:                        12.9   13.0  )-----------( 324      ( 13 Dec 2017 06:16 )             37.4     12-13    137  |  104  |  15  ----------------------------<  97  4.2   |  17<L>  |  0.40<L>    Ca    8.7      13 Dec 2017 06:16  Phos  3.3     12-13  Mg     2.1     12-13    PT/INR - ( 13 Dec 2017 06:16 )   PT: 10.7 sec;   INR: 0.96     PTT - ( 13 Dec 2017 06:16 )  PTT:26.5 sec    Cholesterol, Serum: 191 mg/dL (12-13 @ 06:16)  HDL Cholesterol, Serum: 47 mg/dL (12-13 @ 06:16)  Triglycerides, Serum: 172 mg/dL (12-13 @ 06:16)    Hemoglobin A1C, Whole Blood: 5.8 % (12-13 @ 06:15)    Radiology:  no new imaging.    Assessment:  72 year-old female presents for carotid stent then had right hemiplegia that is improving following antiplatelet therapy.  Her carotid stent was open on vascular imaging last night.  Plan:  1) Continue Plavix 75mg for antiplatelet.    - Would she benefit from Aspirin 81mg for additional antiplatelet post carotid stent.  2) Would consider Atorvastatin 80mg for vessel protection.  3) Consider MRI Brain to assess for acute stroke.  4) Reasonable blood pressure with avoidance of severe hypertension or hypotension  5) PT/OT evaluation  6) DVT prophylaxis - She has intermittent compression device in place; consider heparin SQ TID once cleared by Vascular Surgery.

## 2017-12-13 NOTE — PROGRESS NOTE ADULT - SUBJECTIVE AND OBJECTIVE BOX
SICU DAILY PROGRESS NOTE      INTERVAL HPI / OVERNIGHT EVENTS:   12/13: Given Plavix 75 @7am & Integrilin gtt stopped @8am. Significant improvement in RUE/RLE strength, 4/5.   o/n: ct stroke- No evidence of acute transcortical infarction, given hep bolus, 300 plavix, switched to integrillin gtt  12/12: cannot move rt leg rt hand very decreased strength f/u ct to r/o stroke     MEDICATIONS:  ---NEURO-  ondansetron Injectable 4 milliGRAM(s) IV Push every 6 hours PRN  ---CV-  amLODIPine   Tablet 5 milliGRAM(s) Oral daily  ---GI/FEN-  dextrose 5%. 1000 milliLiter(s) IV Continuous <Continuous>  sodium chloride 0.9%. 1000 milliLiter(s) IV Continuous <Continuous>  ---ENDO-  insulin lispro (HumaLOG) corrective regimen sliding scale   SubCutaneous Before meals and at bedtime  levothyroxine 75 MICROGram(s) Oral daily  ---HEME-  Plavix 75mg  ---OTHER-  chlorhexidine 4% Liquid 1 Application(s) Topical once        ANTIBIOTICS: NONE    PRESSORS: NONE    CENTRAL LINE: NONE    ARTERIAL LINE: NONE    URINARY CATHETER: YES (12/13--)     Remove: NO     Indication: I/O monitoring in critically ill patient.       VOLUME STATUS:   I&O's Detail    12 Dec 2017 07:01  -  13 Dec 2017 07:00  --------------------------------------------------------  IN:    eptifibatide Infusion 75 mg/100 mL: 25.9 mL    Oral Fluid: 20 mL    sodium chloride 0.9%.: 780 mL  Total IN: 825.9 mL    OUT:    Indwelling Catheter - Urethral: 1570 mL  Total OUT: 1570 mL    Total NET: -744.1 mL      13 Dec 2017 07:01  -  13 Dec 2017 09:11  --------------------------------------------------------  IN:    eptifibatide Infusion 75 mg/100 mL: 2.2 mL    sodium chloride 0.9%.: 60 mL  Total IN: 62.2 mL    OUT:    Indwelling Catheter - Urethral: 65 mL  Total OUT: 65 mL    Total NET: -2.8 mL      VITALS:  ICU Vital Signs Last 24 Hrs  T(C): 36.6 (13 Dec 2017 05:27), Max: 36.6 (12 Dec 2017 18:08)  T(F): 97.9 (13 Dec 2017 05:27), Max: 97.9 (12 Dec 2017 23:19)  HR: 78 (13 Dec 2017 08:00) (66 - 114)  BP: 147/69 (13 Dec 2017 08:00) (117/60 - 242/108)  BP(mean): 80 (13 Dec 2017 08:00) (79 - 118)  ABP: --  ABP(mean): --  RR: 32 (13 Dec 2017 08:00) (13 - 32)  SpO2: 97% (13 Dec 2017 08:00) (95% - 100%)      CAPILLARY BLOOD GLUCOSE  87 (12 Dec 2017 20:24)  109 mg/dL (13 Dec 2017 07:00)  107 mg/dL (12 Dec 2017 22:07)  87 mg/dL (12 Dec 2017 18:07)    PHYSICAL EXAM:  NEURO: A&Ox3, NAD, MCCORMACK, SILT, face=, CN 2-12 grossly intact, strength in RLE/RUE improving-4/5.  CV: RRR, S1&S2.   PULM: CTAB; no increased WOB.   ABD: Soft, ND, NT.   EXTR: WWP. No peripheral edema.   MSK: No joint swelling  SKIN: No rashes  PSYCH: Appropriate affect    LABS:                        12.9   13.0  )-----------( 324      ( 13 Dec 2017 06:16 )             37.4     137  |  104  |  15  ----------------------------<  97  4.2   |  17<L>  |  0.40<L>    Ca    8.7      13 Dec 2017 06:16      PT/INR - ( 13 Dec 2017 06:16 )   PT: 10.7 sec;   INR: 0.96     PTT - ( 13 Dec 2017 06:16 )  PTT:26.5 sec

## 2017-12-14 ENCOUNTER — TRANSCRIPTION ENCOUNTER (OUTPATIENT)
Age: 72
End: 2017-12-14

## 2017-12-14 LAB
ANION GAP SERPL CALC-SCNC: 12 MMOL/L — SIGNIFICANT CHANGE UP (ref 5–17)
BUN SERPL-MCNC: 20 MG/DL — SIGNIFICANT CHANGE UP (ref 7–23)
CALCIUM SERPL-MCNC: 9.2 MG/DL — SIGNIFICANT CHANGE UP (ref 8.4–10.5)
CHLORIDE SERPL-SCNC: 105 MMOL/L — SIGNIFICANT CHANGE UP (ref 96–108)
CO2 SERPL-SCNC: 21 MMOL/L — LOW (ref 22–31)
CREAT SERPL-MCNC: 0.53 MG/DL — SIGNIFICANT CHANGE UP (ref 0.5–1.3)
GLUCOSE BLDC GLUCOMTR-MCNC: 121 MG/DL — HIGH (ref 70–99)
GLUCOSE BLDC GLUCOMTR-MCNC: 152 MG/DL — HIGH (ref 70–99)
GLUCOSE BLDC GLUCOMTR-MCNC: 181 MG/DL — HIGH (ref 70–99)
GLUCOSE BLDC GLUCOMTR-MCNC: 94 MG/DL — SIGNIFICANT CHANGE UP (ref 70–99)
GLUCOSE SERPL-MCNC: 99 MG/DL — SIGNIFICANT CHANGE UP (ref 70–99)
HCT VFR BLD CALC: 36.6 % — SIGNIFICANT CHANGE UP (ref 34.5–45)
HGB BLD-MCNC: 12.3 G/DL — SIGNIFICANT CHANGE UP (ref 11.5–15.5)
MAGNESIUM SERPL-MCNC: 2 MG/DL — SIGNIFICANT CHANGE UP (ref 1.6–2.6)
MCHC RBC-ENTMCNC: 28.5 PG — SIGNIFICANT CHANGE UP (ref 27–34)
MCHC RBC-ENTMCNC: 33.6 G/DL — SIGNIFICANT CHANGE UP (ref 32–36)
MCV RBC AUTO: 84.7 FL — SIGNIFICANT CHANGE UP (ref 80–100)
PHOSPHATE SERPL-MCNC: 2.9 MG/DL — SIGNIFICANT CHANGE UP (ref 2.5–4.5)
PLATELET # BLD AUTO: 314 K/UL — SIGNIFICANT CHANGE UP (ref 150–400)
POTASSIUM SERPL-MCNC: 4.2 MMOL/L — SIGNIFICANT CHANGE UP (ref 3.5–5.3)
POTASSIUM SERPL-SCNC: 4.2 MMOL/L — SIGNIFICANT CHANGE UP (ref 3.5–5.3)
RBC # BLD: 4.32 M/UL — SIGNIFICANT CHANGE UP (ref 3.8–5.2)
RBC # FLD: 12.9 % — SIGNIFICANT CHANGE UP (ref 10.3–16.9)
SODIUM SERPL-SCNC: 138 MMOL/L — SIGNIFICANT CHANGE UP (ref 135–145)
WBC # BLD: 12 K/UL — HIGH (ref 3.8–10.5)
WBC # FLD AUTO: 12 K/UL — HIGH (ref 3.8–10.5)

## 2017-12-14 PROCEDURE — 99233 SBSQ HOSP IP/OBS HIGH 50: CPT

## 2017-12-14 RX ADMIN — Medication 2: at 15:36

## 2017-12-14 RX ADMIN — Medication 2: at 22:22

## 2017-12-14 RX ADMIN — Medication 10 MILLIGRAM(S): at 00:38

## 2017-12-14 RX ADMIN — Medication 10 MILLIGRAM(S): at 17:53

## 2017-12-14 RX ADMIN — Medication 81 MILLIGRAM(S): at 11:40

## 2017-12-14 RX ADMIN — Medication 10 MILLIGRAM(S): at 20:56

## 2017-12-14 RX ADMIN — Medication 75 MICROGRAM(S): at 06:03

## 2017-12-14 RX ADMIN — ATORVASTATIN CALCIUM 80 MILLIGRAM(S): 80 TABLET, FILM COATED ORAL at 22:22

## 2017-12-14 RX ADMIN — CLOPIDOGREL BISULFATE 75 MILLIGRAM(S): 75 TABLET, FILM COATED ORAL at 11:40

## 2017-12-14 RX ADMIN — AMLODIPINE BESYLATE 5 MILLIGRAM(S): 2.5 TABLET ORAL at 07:01

## 2017-12-14 NOTE — OCCUPATIONAL THERAPY INITIAL EVALUATION ADULT - GENERAL OBSERVATIONS, REHAB EVAL
Right hand dominant. Patient cleared for Occupational Therapy by covering CHER Watson. Patient received seated up in recliner chair in non-acute distress, +EKG, +IV heplock. Patient denies, c/o right sided weakness.

## 2017-12-14 NOTE — OCCUPATIONAL THERAPY INITIAL EVALUATION ADULT - DIAGNOSIS, OT EVAL
Impaired functional mobility and Activities of Daily Living 2/2 right sided weakness, impaired dynamic standing balance affecting safety and independence

## 2017-12-14 NOTE — DISCHARGE NOTE ADULT - PLAN OF CARE
Return to activities of daily living. Activity: Walk as tolerated. No heavy lifting or strenuous activity. New medication: Plavix 75mg daily. Diet: low fat. Follow up with Dr Gloria 2 weeks after discharge. Call office for appointment. Office; [phone] Recover right upper and lower extremity weakness. Activity: Out patient physical therapy. Medication: Aspirin and plavix. Recover balance with walking. Improve strength in right upper and lower extremities. Activity: Out patient physical therapy and occupational therapy. Medication: Aspirin and plavix. You have a 30 day supply of plavix. Please get a new prescription from Dr Gloria when you see him in 2 weeks.

## 2017-12-14 NOTE — PHYSICAL THERAPY INITIAL EVALUATION ADULT - MANUAL MUSCLE TESTING RESULTS, REHAB EVAL
LUE/LE 5/5; R: shoulder flex 4-/5, elbow flex/ext 4+/5, hip flex 3+/5, knee flex/ext 4+/5, ankle DF/PF 5/5, moderate dec  strength RUE in comparison to CARMENE

## 2017-12-14 NOTE — DISCHARGE NOTE ADULT - CARE PROVIDER_API CALL
Enrique Gloria), Vascular Surgery  [address]  [place], [place] [address]  Phone: [phone]  Fax: [phone]

## 2017-12-14 NOTE — OCCUPATIONAL THERAPY INITIAL EVALUATION ADULT - MANUAL MUSCLE TESTING RESULTS, REHAB EVAL
Smile symmetrical, tongue protrusion in midline, cheeks puff and eyebrows elevation grossly intact; left upper extremity 5/5 throughout; right upper extremity shoulder flexion 4-/5, elbow flexion/extension 5/5, hand  4-/5.

## 2017-12-14 NOTE — OCCUPATIONAL THERAPY INITIAL EVALUATION ADULT - PERTINENT HX OF CURRENT PROBLEM, REHAB EVAL
S/P Carotid stent placement left internal carotid artery 12/12/2017. Stroke code was called on 12/12 at 18:05 for acute right-sided hemiplegia. Patient states there was heaviness in her R arm and leg, preventing her from lifting. However, is able to wiggle fingers and toes. Patient given heparin bolus at 18:05 12/12.

## 2017-12-14 NOTE — PHYSICAL THERAPY INITIAL EVALUATION ADULT - DIAGNOSIS, PT EVAL
Impaired Motor Function and Sensory Integrity Associated with Progressive Disorders of the Central Nervous System

## 2017-12-14 NOTE — DISCHARGE NOTE ADULT - CARE PLAN
Principal Discharge DX:	Carotid stenosis, left  Goal:	Return to activities of daily living.  Instructions for follow-up, activity and diet:	Activity: Walk as tolerated. No heavy lifting or strenuous activity. New medication: Plavix 75mg daily. Diet: low fat. Follow up with Dr Gloria 2 weeks after discharge. Call office for appointment. Office; [phone]  Secondary Diagnosis:	CVA (cerebral vascular accident)  Goal:	Recover right upper and lower extremity weakness.  Instructions for follow-up, activity and diet:	Activity: Out patient physical therapy. Medication: Aspirin and plavix. Principal Discharge DX:	Carotid stenosis, left  Goal:	Return to activities of daily living.  Instructions for follow-up, activity and diet:	Activity: Walk as tolerated. No heavy lifting or strenuous activity. New medication: Plavix 75mg daily. Diet: low fat. Follow up with Dr Gloria 2 weeks after discharge. Call office for appointment. Office; [phone]  Secondary Diagnosis:	CVA (cerebral vascular accident)  Goal:	Recover balance with walking. Improve strength in right upper and lower extremities.  Instructions for follow-up, activity and diet:	Activity: Out patient physical therapy and occupational therapy. Medication: Aspirin and plavix. You have a 30 day supply of plavix. Please get a new prescription from Dr Gloria when you see him in 2 weeks.

## 2017-12-14 NOTE — OCCUPATIONAL THERAPY INITIAL EVALUATION ADULT - PLANNED THERAPY INTERVENTIONS, OT EVAL
IADL retraining/strengthening/motor coordination training/neuromuscular re-education/balance training/fine motor coordination training/transfer training/ADL retraining

## 2017-12-14 NOTE — PROGRESS NOTE ADULT - SUBJECTIVE AND OBJECTIVE BOX
Neurology Stroke Follow Up     Interval History:  Patient seen and examined.  No acute complaints.  Now on Aspirin and Plavix.  No bleeding in urine or stool.  She started moving her right arm and leg overnight.  No sensory changes.  No speech or visual deficits.    Medications:  MEDICATIONS  (STANDING):  amLODIPine   Tablet 5 milliGRAM(s) Oral daily  atorvastatin 80 milliGRAM(s) Oral at bedtime  chlorhexidine 4% Liquid 1 Application(s) Topical once  insulin lispro (HumaLOG) corrective regimen sliding scale   SubCutaneous Before meals and at bedtime  levothyroxine 75 MICROGram(s) Oral daily  sodium chloride 0.9%. 1000 milliLiter(s) (60 mL/Hr) IV Continuous <Continuous>    MEDICATIONS  (PRN):  ondansetron Injectable 4 milliGRAM(s) IV Push every 6 hours PRN Nausea and/or Vomiting    Allergies  No Known Allergies    Exam:  Vital Signs Last 24 Hrs  T(C): 35.9 (13 Dec 2017 14:30), Max: 36.6 (12 Dec 2017 18:08)  T(F): 96.7 (13 Dec 2017 14:30), Max: 97.9 (12 Dec 2017 23:19)  HR: 72 (13 Dec 2017 16:00) (62 - 114)  BP: 143/67 (13 Dec 2017 16:00) (117/60 - 242/108)  BP(mean): 102 (13 Dec 2017 16:00) (79 - 118)  RR: 23 (13 Dec 2017 16:00) (13 - 34)  SpO2: 97% (13 Dec 2017 16:00) (95% - 100%)    Gen: Lying in bed, calm, cooperative, in NAD  CV: RRR  Extremities: 2+ radial pulses bilaterally  Neuro:  Mental status: Awake, alert and oriented x3. fluent, no dysarthria, follows all commands. Recent and remote memory intact. Attention/concentration intact.  fund of knowledge intact  Cranial nerves: Pupils equally round and reactive to light, visual fields full, no nystagmus, extraocular muscles intact, V1 through V3 intact bilaterally and symmetric, no facial droop, palate elevation symmetric, tongue was midline    Motor:  Normal bulk and tone, Right - She had trouble initiating movement of her right side but then 4-/5, Left 5/5     Sensation: Intact to light touch, temperature bilaterally   Coordination: No dysmetria on finger-to-nose bilaterally   Reflexes: 2+ in upper and lower extremities, absent Babinski bilaterally  Gait: deferred    Labs:                        12.9   13.0  )-----------( 324      ( 13 Dec 2017 06:16 )             37.4     12-13    137  |  104  |  15  ----------------------------<  97  4.2   |  17<L>  |  0.40<L>    Ca    8.7      13 Dec 2017 06:16  Phos  3.3     12-13  Mg     2.1     12-13    PT/INR - ( 13 Dec 2017 06:16 )   PT: 10.7 sec;   INR: 0.96     PTT - ( 13 Dec 2017 06:16 )  PTT:26.5 sec    Cholesterol, Serum: 191 mg/dL (12-13 @ 06:16)  HDL Cholesterol, Serum: 47 mg/dL (12-13 @ 06:16)  Triglycerides, Serum: 172 mg/dL (12-13 @ 06:16)    Hemoglobin A1C, Whole Blood: 5.8 % (12-13 @ 06:15)    Radiology:  no new imaging.    Assessment:  72 year-old female presents for carotid stent then had right hemiplegia that is improving following antiplatelet therapy.  Her carotid stent was open on vascular imaging last night.  Plan:  1) Continue Plavix 75mg for antiplatelet.    - Would she benefit from Aspirin 81mg for additional antiplatelet post carotid stent.  2) Would consider Atorvastatin 80mg for vessel protection.  3) Consider MRI Brain to assess for acute stroke.  4) Reasonable blood pressure with avoidance of severe hypertension or hypotension  5) PT/OT evaluation  6) DVT prophylaxis - She has intermittent compression device in place; consider heparin SQ TID once cleared by Vascular Surgery. Neurology Stroke Follow Up     Interval History:  Patient seen and examined.  No acute complaints.  Now on Aspirin and Plavix.  No bleeding in urine or stool.  Her right arm and leg are stable but she's more concerned.  No sensory changes.  No speech or visual deficits.    MEDICATIONS  (STANDING):  amLODIPine   Tablet 5 milliGRAM(s) Oral daily  aspirin  chewable 81 milliGRAM(s) Oral daily  atorvastatin 80 milliGRAM(s) Oral at bedtime  clopidogrel Tablet 75 milliGRAM(s) Oral daily  insulin lispro (HumaLOG) corrective regimen sliding scale   SubCutaneous Before meals and at bedtime  levothyroxine 75 MICROGram(s) Oral daily    MEDICATIONS  (PRN):  labetalol Injectable 10 milliGRAM(s) IV Push every 3 hours PRN SBP > 160  ondansetron Injectable 4 milliGRAM(s) IV Push every 6 hours PRN Nausea and/or Vomiting    Allergies  No Known Allergies    Exam:  Vital Signs Last 24 Hrs  T(C): 35.9 (14 Dec 2017 13:46), Max: 36.8 (14 Dec 2017 09:33)  T(F): 96.7 (14 Dec 2017 13:46), Max: 98.2 (14 Dec 2017 09:33)  HR: 84 (14 Dec 2017 13:00) (60 - 88)  BP: 143/62 (14 Dec 2017 13:00) (123/66 - 182/80)  BP(mean): 89 (14 Dec 2017 13:00) (87 - 137)  RR: 22 (14 Dec 2017 13:00) (12 - 29)  SpO2: 99% (14 Dec 2017 13:00) (90% - 99%)    Gen: sitting in chair, NAD  CV: RRR  Extremities: 2+ radial pulses bilaterally    Neurological exam:  Mental status: Awake, alert and oriented x3. fluent, no dysarthria, follows all commands. Recent and remote memory intact. Attention/concentration intact.  fund of knowledge intact  Cranial nerves: Pupils equally round and reactive to light, visual fields full, no nystagmus, extraocular muscles intact, V1 through V3 intact bilaterally and symmetric, no facial droop, palate elevation symmetric, tongue was midline    Motor:  Normal bulk and tone, Right side slightly better than yesterday with right UE 4/5 and right LE proximally 4/5, distally 4+/5, Left 5/5     Sensation: Intact to light touch, temperature bilaterally   Coordination: No dysmetria on finger-to-nose bilaterally   Reflexes: 2+ in upper and lower extremities, absent Babinski bilaterally  Gait: deferred    Labs:                        12.3   12.0  )-----------( 314      ( 14 Dec 2017 06:11 )             36.6   12-14    138  |  105  |  20  ----------------------------<  99  4.2   |  21<L>  |  0.53    Ca    9.2      14 Dec 2017 06:11  Phos  2.9     12-14  Mg     2.0     12-14    Cholesterol, Serum: 191 mg/dL (12-13 @ 06:16)  HDL Cholesterol, Serum: 47 mg/dL (12-13 @ 06:16)  Triglycerides, Serum: 172 mg/dL (12-13 @ 06:16)    Hemoglobin A1C, Whole Blood: 5.8 % (12-13 @ 06:15)    Radiology:  MR Head No Cont (12.13.17 @ 22:43) >  Scattered areas of acute infarction involving the supratentorial left   cerebral hemisphere as described above. Focal areas of hemorrhagic   transformation involving infarcts at the left parietal and left temporal   lobe. Moderate to severe chronic white matter microangiopathic ischemic   disease. Chronic left pontine infarct. No midline shift or hydrocephalus.    Assessment:  72 year-old female presents for carotid stent then had right hemiplegia that is improving following antiplatelet therapy.  Her carotid stent was open on vascular imaging last night.  MRI Brain demonstrates left cerebral infarcts.  Plan:  1) Continue dual antiplatelet therapy with Aspirin 81mg and Plavix 75mg.  2) Continue Atorvastatin 80mg for vessel protection.  3) Stroke workup complete   4) PT/OT evaluation pending  5) HTN - consider better control  6) DVT prophylaxis - She has intermittent compression device in place; consider heparin SQ TID once cleared by Vascular Surgery.    Neurologically cleared for discharge once evaluated by physical therapy.    Plan as per Vascular Surgery.

## 2017-12-14 NOTE — PHYSICAL THERAPY INITIAL EVALUATION ADULT - MODIFIED CLINICAL TEST OF SENSORY INTEGRATION IN BALANCE TEST
Maintain static standing x30s no LOB, static standing with EC x10s before LOB, Romberg stance x30s with CGA and min sway with no LOB, Romberg stance with EC <5s before LOB, Unable to obtain tandem stance due to dec balance

## 2017-12-14 NOTE — DISCHARGE NOTE ADULT - HOSPITAL COURSE
71 yo F with HTN, HLD, hypothyroidism, and h/o bilateral carotid stenosis s/p R CEA (3/2014) and L CEA (4/2016), now found to have restenosis in left carotid artery seen on office duplex and CTA neck (>90% stenosis just beyond bifurcation).     She presents today for elective left carotid stent placement. Pre-op, no weakness, no paresthesias, no facial droop, no slurred speech. No fevers, no nausea, no CP/SOA. 73 yo F with HTN, HLD, hypothyroidism, and h/o bilateral carotid stenosis s/p R CEA (3/2014) and L CEA (4/2016), found to have restenosis in left carotid artery seen on office duplex and CTA neck (>90% stenosis just beyond bifurcation).  Pt presented for elective left carotid stent placement. Pre-op, no weakness, no paresthesias, no facial droop, no slurred speech. No fevers, no nausea, no CP/SOA. Pt taken to cath lab on 12/12/17. She had Left carotid angiogram and left ICA stent placement via  right CFA.   She tolerated the procedure well. Post op in cath RR, pt had no neurological deficits. Her sheath was removed without complications. 73 yo F with HTN, HLD, hypothyroidism, and h/o bilateral carotid stenosis s/p R CEA (3/2014) and L CEA (4/2016), found to have restenosis in left carotid artery seen on office duplex and CTA neck (>90% stenosis just beyond bifurcation).  Pt presented for elective left carotid stent placement. Pre-op, no weakness, no paresthesias, no facial droop, no slurred speech. No fevers, no nausea, no CP/SOA. Pt taken to cath lab on 12/12/17. She had Left carotid angiogram and left ICA stent placement via  right CFA.   She tolerated the procedure well. Post op in cath RR, pt had no neurological deficits. Her sheath was removed without complications.  Upon return to her room she began to have increasing R proximal RUE weakness followed by R sided hemiparesis. Stroke code was called.  Ct stroke showed no evidence of acute transcortical infarction, given hep bolus, 300 Plavix, transferred to SICU and  switched to Integrilin gtt.. The following morning her symptoms had improved, now having 4/5 strength in upper and lower R extremities. Currently she has gait ataxia and R shoulder weakness, she has remained afebrile, hemodynamically stable and is ready for discharge home with outpatient PT/OT services.

## 2017-12-14 NOTE — OCCUPATIONAL THERAPY INITIAL EVALUATION ADULT - MD ORDER
73 yo F with HTN, HLD, hypothyroidism, and h/o bilateral carotid stenosis s/p R CEA (3/2014) and L CEA (4/2016), now found to have restenosis in left carotid artery seen on office duplex and CTA neck (>90% stenosis just beyond bifurcation). She presents today for elective left carotid stent placement.

## 2017-12-14 NOTE — DISCHARGE NOTE ADULT - MEDICATION SUMMARY - MEDICATIONS TO TAKE
I will START or STAY ON the medications listed below when I get home from the hospital:    aspirin 81 mg oral tablet  -- 1 tab(s) by mouth once a day. MAKE SURE YOU TAKE THIS EVERYDAY!!!  -- Indication: For Carotid artery disease    Lyrica 100 mg oral capsule  -- 1 cap(s) by mouth 2 times a day  -- Indication: For Home medication    simvastatin 40 mg oral tablet  --  by mouth   -- Indication: For Hyperlipidemia    clopidogrel 75 mg oral tablet  -- 1 tab(s) by mouth once a day  -- Indication: For Carotid artery disease    amLODIPine 5 mg oral tablet  -- 1 tab(s) by mouth once a day  -- Indication: For Hypertension    omega-3 polyunsaturated fatty acids 1000 mg oral capsule  --  by mouth   -- Indication: For Home medication    Dexilant 60 mg oral delayed release capsule  -- 1 cap(s) by mouth once a day  -- Indication: For Home medication    Synthroid 75 mcg (0.075 mg) oral tablet  -- 1 tab(s) by mouth once a day  -- Indication: For Hypothyroidism I will START or STAY ON the medications listed below when I get home from the hospital:    aspirin 81 mg oral tablet  -- 1 tab(s) by mouth once a day. MAKE SURE YOU TAKE THIS EVERYDAY!!!  -- Indication: For Carotid artery disease    Lyrica 100 mg oral capsule  -- 1 cap(s) by mouth 2 times a day  -- Indication: For Home medication    simvastatin 40 mg oral tablet  --  by mouth   -- Indication: For Hyperlipidemia    Plavix 75 mg oral tablet  -- 1 tab(s) by mouth once a day MDD:1  -- Do not take aspirin or aspirin containing products without knowledge and consent of your physician.    -- Indication: For Stent    amLODIPine 5 mg oral tablet  -- 1 tab(s) by mouth once a day  -- Indication: For Hypertension    omega-3 polyunsaturated fatty acids 1000 mg oral capsule  --  by mouth   -- Indication: For Home medication    Dexilant 60 mg oral delayed release capsule  -- 1 cap(s) by mouth once a day  -- Indication: For Home medication    Synthroid 75 mcg (0.075 mg) oral tablet  -- 1 tab(s) by mouth once a day  -- Indication: For Hypothyroidism

## 2017-12-14 NOTE — OCCUPATIONAL THERAPY INITIAL EVALUATION ADULT - COORDINATION ASSESSED, REHAB EVAL
finger to nose/left upper extremity grossly intact; right upper extremity mildly decreased coordination

## 2017-12-14 NOTE — PHYSICAL THERAPY INITIAL EVALUATION ADULT - ADDITIONAL COMMENTS
Pt lives alone in first floor apartment with no steps to enter. Ambulates with a cane at baseline independently. No HHA, reports being very independent with ADLs.

## 2017-12-14 NOTE — OCCUPATIONAL THERAPY INITIAL EVALUATION ADULT - STANDING BALANCE: DYNAMIC, REHAB EVAL
ambulated total of 20 feet with Jeremías/[email or web address] right Hand Held Assist 2/2 imbalance and right sided weakness/poor plus

## 2017-12-14 NOTE — PHYSICAL THERAPY INITIAL EVALUATION ADULT - PERTINENT HX OF CURRENT PROBLEM, REHAB EVAL
71 yo F with HTN, HLD, hypothyroidism, and h/o bilateral carotid stenosis s/p R CEA (3/2014) and L CEA (4/2016), now found to have restenosis in left carotid artery seen on office duplex and CTA neck (>90% stenosis just beyond bifurcation).

## 2017-12-14 NOTE — PROGRESS NOTE ADULT - SUBJECTIVE AND OBJECTIVE BOX
SICU DAILY PROGRESS NOTE      INTERVAL HPI / OVERNIGHT EVENTS:   12/14: contacted [facility] regarding MRI:   O/N: had MRI, in for tele, passed TOV  12/13: Given Plavix 75 @7am & Integrilin gtt stopped @8am. Significant improvement in RUE/RLE strength, 4/5. Started Lipitor 80 & ASA81. PT/OT eval. Approved for regular diet by Sp/Sw eval.  MRI orderedfor am    MEDICATIONS:  ---NEURO-  ondansetron Injectable 4 milliGRAM(s) IV Push every 6 hours PRN  ---CV-  amLODIPine   Tablet 5 milliGRAM(s) Oral daily  labetalol Injectable 10 milliGRAM(s) IV Push every 3 hours PRN  ---GI/FEN-  dextrose 5%. 1000 milliLiter(s) IV Continuous <Continuous>  ---ENDO-  insulin lispro (HumaLOG) corrective regimen sliding scale   SubCutaneous Before meals and at bedtime  levothyroxine 75 MICROGram(s) Oral daily  ---HEME-  aspirin  chewable 81 milliGRAM(s) Oral daily  clopidogrel Tablet 75 milliGRAM(s) Oral daily      ANTIBIOTICS: NONE    PRESSORS: NONE    CENTRAL LINE: NONE    ARTERIAL LINE: NONE    URINARY CATHETER: NONE       VOLUME STATUS:   I&O's Detail    13 Dec 2017 07:01  -  14 Dec 2017 07:00  --------------------------------------------------------  IN:    eptifibatide Infusion 75 mg/100 mL: 2.2 mL    sodium chloride 0.9%: 1440 mL  Total IN: 1442.2 mL    OUT:    Indwelling Catheter - Urethral: 870 mL    Voided: 1000 mL  Total OUT: 1870 mL    Total NET: -427.8 mL      14 Dec 2017 07:01  -  14 Dec 2017 09:14  --------------------------------------------------------  IN:    sodium chloride 0.9%: 60 mL  Total IN: 60 mL    OUT:  Total OUT: 0 mL    Total NET: 60 mL      VITALS:  ICU Vital Signs Last 24 Hrs  T(C): 36.6 (14 Dec 2017 06:45), Max: 36.6 (13 Dec 2017 17:57)  T(F): 97.9 (14 Dec 2017 06:45), Max: 97.9 (14 Dec 2017 06:45)  HR: 68 (14 Dec 2017 07:00) (60 - 94)  BP: 150/74 (14 Dec 2017 07:00) (123/66 - 182/80)  BP(mean): 114 (14 Dec 2017 07:00) (82 - 137)  ABP: --  ABP(mean): --  RR: 24 (14 Dec 2017 07:00) (12 - 34)  SpO2: 96% (14 Dec 2017 07:00) (90% - 98%)      CAPILLARY BLOOD GLUCOSE  94 mg/dL (14 Dec 2017 06:52)  86 mg/dL (13 Dec 2017 21:11)  241 mg/dL (13 Dec 2017 16:58)  189 mg/dL (13 Dec 2017 11:25)    PHYSICAL EXAM:  NEURO: A&Ox3, NAD, MCCORMACK, SILT, face=, CN 2-12 grossly intact, strength in RLE/RUE improving-4/5.  CV: RRR, S1&S2.   PULM: CTAB; no increased WOB.   ABD: Soft, ND, NT.   EXTR: WWP. No peripheral edema.   MSK: No joint swelling  SKIN: No rashes  PSYCH: Appropriate affect    LABS:                        12.3   12.0  )-----------( 314      ( 14 Dec 2017 06:11 )             36.6     138  |  105  |  20  ----------------------------<  99  4.2   |  21<L>  |  0.53    Ca    9.2      14 Dec 2017 06:11  Phos  2.9     12-14  Mg     2.0     12-14

## 2017-12-14 NOTE — PHYSICAL THERAPY INITIAL EVALUATION ADULT - ASSISTIVE DEVICE FOR TRANSFER: GAIT, REHAB EVAL
portable monitor; trialed ambulation with CGA and no AD however pt refused due to increasing unsteadiness

## 2017-12-14 NOTE — DISCHARGE NOTE ADULT - NS AS ACTIVITY OBS
Walking-Outdoors allowed/Stairs allowed/Showering allowed/Do not drive or operate machinery/Walking-Indoors allowed/No Heavy lifting/straining

## 2017-12-15 LAB
ANION GAP SERPL CALC-SCNC: 11 MMOL/L — SIGNIFICANT CHANGE UP (ref 5–17)
ANION GAP SERPL CALC-SCNC: 14 MMOL/L — SIGNIFICANT CHANGE UP (ref 5–17)
BUN SERPL-MCNC: 23 MG/DL — SIGNIFICANT CHANGE UP (ref 7–23)
BUN SERPL-MCNC: 28 MG/DL — HIGH (ref 7–23)
CALCIUM SERPL-MCNC: 10.5 MG/DL — SIGNIFICANT CHANGE UP (ref 8.4–10.5)
CALCIUM SERPL-MCNC: 9.7 MG/DL — SIGNIFICANT CHANGE UP (ref 8.4–10.5)
CHLORIDE SERPL-SCNC: 100 MMOL/L — SIGNIFICANT CHANGE UP (ref 96–108)
CHLORIDE SERPL-SCNC: 104 MMOL/L — SIGNIFICANT CHANGE UP (ref 96–108)
CO2 SERPL-SCNC: 23 MMOL/L — SIGNIFICANT CHANGE UP (ref 22–31)
CO2 SERPL-SCNC: 24 MMOL/L — SIGNIFICANT CHANGE UP (ref 22–31)
CREAT SERPL-MCNC: 0.6 MG/DL — SIGNIFICANT CHANGE UP (ref 0.5–1.3)
CREAT SERPL-MCNC: 0.67 MG/DL — SIGNIFICANT CHANGE UP (ref 0.5–1.3)
GLUCOSE BLDC GLUCOMTR-MCNC: 113 MG/DL — HIGH (ref 70–99)
GLUCOSE BLDC GLUCOMTR-MCNC: 116 MG/DL — HIGH (ref 70–99)
GLUCOSE BLDC GLUCOMTR-MCNC: 127 MG/DL — HIGH (ref 70–99)
GLUCOSE BLDC GLUCOMTR-MCNC: 127 MG/DL — HIGH (ref 70–99)
GLUCOSE SERPL-MCNC: 108 MG/DL — HIGH (ref 70–99)
GLUCOSE SERPL-MCNC: 132 MG/DL — HIGH (ref 70–99)
HCT VFR BLD CALC: 37.8 % — SIGNIFICANT CHANGE UP (ref 34.5–45)
HCT VFR BLD CALC: 40.6 % — SIGNIFICANT CHANGE UP (ref 34.5–45)
HGB BLD-MCNC: 12.4 G/DL — SIGNIFICANT CHANGE UP (ref 11.5–15.5)
HGB BLD-MCNC: 13.6 G/DL — SIGNIFICANT CHANGE UP (ref 11.5–15.5)
MAGNESIUM SERPL-MCNC: 2 MG/DL — SIGNIFICANT CHANGE UP (ref 1.6–2.6)
MCHC RBC-ENTMCNC: 28.2 PG — SIGNIFICANT CHANGE UP (ref 27–34)
MCHC RBC-ENTMCNC: 28.7 PG — SIGNIFICANT CHANGE UP (ref 27–34)
MCHC RBC-ENTMCNC: 32.8 G/DL — SIGNIFICANT CHANGE UP (ref 32–36)
MCHC RBC-ENTMCNC: 33.5 G/DL — SIGNIFICANT CHANGE UP (ref 32–36)
MCV RBC AUTO: 85.7 FL — SIGNIFICANT CHANGE UP (ref 80–100)
MCV RBC AUTO: 85.9 FL — SIGNIFICANT CHANGE UP (ref 80–100)
PHOSPHATE SERPL-MCNC: 3.3 MG/DL — SIGNIFICANT CHANGE UP (ref 2.5–4.5)
PLATELET # BLD AUTO: 345 K/UL — SIGNIFICANT CHANGE UP (ref 150–400)
PLATELET # BLD AUTO: 420 K/UL — HIGH (ref 150–400)
POTASSIUM SERPL-MCNC: 4.6 MMOL/L — SIGNIFICANT CHANGE UP (ref 3.5–5.3)
POTASSIUM SERPL-MCNC: 5 MMOL/L — SIGNIFICANT CHANGE UP (ref 3.5–5.3)
POTASSIUM SERPL-SCNC: 4.6 MMOL/L — SIGNIFICANT CHANGE UP (ref 3.5–5.3)
POTASSIUM SERPL-SCNC: 5 MMOL/L — SIGNIFICANT CHANGE UP (ref 3.5–5.3)
RBC # BLD: 4.4 M/UL — SIGNIFICANT CHANGE UP (ref 3.8–5.2)
RBC # BLD: 4.74 M/UL — SIGNIFICANT CHANGE UP (ref 3.8–5.2)
RBC # FLD: 12.7 % — SIGNIFICANT CHANGE UP (ref 10.3–16.9)
RBC # FLD: 12.9 % — SIGNIFICANT CHANGE UP (ref 10.3–16.9)
SODIUM SERPL-SCNC: 137 MMOL/L — SIGNIFICANT CHANGE UP (ref 135–145)
SODIUM SERPL-SCNC: 139 MMOL/L — SIGNIFICANT CHANGE UP (ref 135–145)
WBC # BLD: 14.4 K/UL — HIGH (ref 3.8–10.5)
WBC # BLD: 16.5 K/UL — HIGH (ref 3.8–10.5)
WBC # FLD AUTO: 14.4 K/UL — HIGH (ref 3.8–10.5)
WBC # FLD AUTO: 16.5 K/UL — HIGH (ref 3.8–10.5)

## 2017-12-15 PROCEDURE — 99232 SBSQ HOSP IP/OBS MODERATE 35: CPT

## 2017-12-15 RX ORDER — HEPARIN SODIUM 5000 [USP'U]/ML
5000 INJECTION INTRAVENOUS; SUBCUTANEOUS EVERY 12 HOURS
Qty: 0 | Refills: 0 | Status: DISCONTINUED | OUTPATIENT
Start: 2017-12-15 | End: 2017-12-19

## 2017-12-15 RX ORDER — SODIUM POLYSTYRENE SULFONATE 4.1 MEQ/G
30 POWDER, FOR SUSPENSION ORAL ONCE
Qty: 0 | Refills: 0 | Status: COMPLETED | OUTPATIENT
Start: 2017-12-15 | End: 2017-12-15

## 2017-12-15 RX ADMIN — Medication 10 MILLIGRAM(S): at 00:41

## 2017-12-15 RX ADMIN — Medication 10 MILLIGRAM(S): at 19:08

## 2017-12-15 RX ADMIN — AMLODIPINE BESYLATE 5 MILLIGRAM(S): 2.5 TABLET ORAL at 05:24

## 2017-12-15 RX ADMIN — Medication 75 MICROGRAM(S): at 05:24

## 2017-12-15 RX ADMIN — HEPARIN SODIUM 5000 UNIT(S): 5000 INJECTION INTRAVENOUS; SUBCUTANEOUS at 19:08

## 2017-12-15 RX ADMIN — Medication 10 MILLIGRAM(S): at 22:24

## 2017-12-15 RX ADMIN — CLOPIDOGREL BISULFATE 75 MILLIGRAM(S): 75 TABLET, FILM COATED ORAL at 12:13

## 2017-12-15 RX ADMIN — SODIUM POLYSTYRENE SULFONATE 30 GRAM(S): 4.1 POWDER, FOR SUSPENSION ORAL at 21:03

## 2017-12-15 RX ADMIN — Medication 81 MILLIGRAM(S): at 12:14

## 2017-12-15 RX ADMIN — ATORVASTATIN CALCIUM 80 MILLIGRAM(S): 80 TABLET, FILM COATED ORAL at 21:55

## 2017-12-15 NOTE — DIETITIAN INITIAL EVALUATION ADULT. - ENERGY NEEDS
Height 62"; #; #; 108%IBW  BMI 21.4  ABW used for calculations as pt between % of IBW. Needs estimated 2/2 post-operation.

## 2017-12-15 NOTE — PROGRESS NOTE ADULT - SUBJECTIVE AND OBJECTIVE BOX
Neurology Stroke Follow Up     Interval History:  Patient seen and examined.  No acute complaints.  She thinks that she's better than yesterday but still weak on right side.  She hasn't done much walking.  Aspirin and Plavix - No bleeding in urine or stool.  No sensory changes.  No speech or visual deficits.    MEDICATIONS  (STANDING):  amLODIPine   Tablet 5 milliGRAM(s) Oral daily  aspirin  chewable 81 milliGRAM(s) Oral daily  atorvastatin 80 milliGRAM(s) Oral at bedtime  clopidogrel Tablet 75 milliGRAM(s) Oral daily  insulin lispro (HumaLOG) corrective regimen sliding scale   SubCutaneous Before meals and at bedtime  levothyroxine 75 MICROGram(s) Oral daily    MEDICATIONS  (PRN):  labetalol Injectable 10 milliGRAM(s) IV Push every 3 hours PRN SBP > 160  ondansetron Injectable 4 milliGRAM(s) IV Push every 6 hours PRN Nausea and/or Vomiting    Allergies  No Known Allergies    Exam:  Vital Signs Last 24 Hrs  T(C): 35.9 (15 Dec 2017 09:27), Max: 36.4 (14 Dec 2017 16:38)  T(F): 96.7 (15 Dec 2017 09:27), Max: 97.5 (14 Dec 2017 16:38)  HR: 64 (15 Dec 2017 08:44) (58 - 84)  BP: 138/64 (15 Dec 2017 08:44) (116/58 - 187/84)  BP(mean): 116 (14 Dec 2017 17:00) (89 - 116)  RR: 16 (15 Dec 2017 08:44) (16 - 28)  SpO2: 97% (15 Dec 2017 08:44) (96% - 100%)    Gen: sitting at edge of bed, NAD  CV: RRR  Extremities: 2+ radial pulses bilaterally    Neurological exam:  Mental status: Awake, alert and oriented x3. fluent, no dysarthria, follows all commands. Recent and remote memory intact. Attention/concentration intact.  fund of knowledge intact  Cranial nerves: Pupils equally round and reactive to light, visual fields full, no nystagmus, extraocular muscles intact, V1 through V3 intact bilaterally and symmetric, no facial droop, palate elevation symmetric, tongue was midline    Motor:  Normal bulk and tone, Right side even stronger than yesterday with right deltoid 5+/5, biceps& triceps 4/5, hand  4+/5 and right LE proximally 4+/5, distally 5-/5, Left 5/5     Sensation: Intact to light touch, temperature bilaterally   Coordination: No dysmetria on finger-to-nose bilaterally   Reflexes: 2+ in upper and lower extremities, absent Babinski bilaterally  Gait: deferred  NIHSS = 2    Labs:                        12.4   14.4  )-----------( 345      ( 15 Dec 2017 07:01 )             37.8   12-15    139  |  104  |  23  ----------------------------<  108<H>  5.0   |  24  |  0.67    Ca    9.7      15 Dec 2017 07:01  Phos  3.3     12-15  Mg     2.0     12-15    Cholesterol, Serum: 191 mg/dL (12-13 @ 06:16)  HDL Cholesterol, Serum: 47 mg/dL (12-13 @ 06:16)  Triglycerides, Serum: 172 mg/dL (12-13 @ 06:16)    Hemoglobin A1C, Whole Blood: 5.8 % (12-13 @ 06:15)    Radiology:  MR Head No Cont (12.13.17 @ 22:43) >  Scattered areas of acute infarction involving the supratentorial left   cerebral hemisphere as described above. Focal areas of hemorrhagic   transformation involving infarcts at the left parietal and left temporal   lobe. Moderate to severe chronic white matter microangiopathic ischemic   disease. Chronic left pontine infarct. No midline shift or hydrocephalus.    Assessment:  72 year-old female presents for carotid stent then had right hemiplegia that is improving following antiplatelet therapy.  Her carotid stent was open on vascular imaging.  MRI Brain demonstrates left cerebral infarcts.  Plan:  1) Continue dual antiplatelet therapy with Aspirin 81mg and Plavix 75mg.  2) Continue Atorvastatin 80mg for vessel protection.  3) Stroke workup complete   4) PT/OT evaluation - for acute rehab when bed available.  5) HTN - consider better control    Plan as per Vascular Surgery.

## 2017-12-15 NOTE — PROGRESS NOTE ADULT - SUBJECTIVE AND OBJECTIVE BOX
o/n: RODRIGUE  12/14: contacted [facility] regarding [facility]. PT recs acute rehab given poor balance/stroke dx/lives alone.       71 yo F with HTN, HLD, hypothyroidism with h/o b/l carotid stenosis s/p R CEA 2014, L CEA 2016 with L carotid 90% restenosis s/p L ICA stent with ischemic stroke, R sided weakness post op    pain control  Regular diet   ISS  Plavix 75, ASA81  SCDs  PT- acute rehab; OT eval o/n: RODRIGUE  12/14: contacted [facility] regarding [facility]. PT recs acute rehab given poor balance/stroke dx/lives alone.     amLODIPine   Tablet 5  aspirin  chewable 81  clopidogrel Tablet 75  labetalol Injectable 10 PRN      Allergies    No Known Allergies    Intolerances        Vital Signs Last 24 Hrs  T(C): 35.9 (15 Dec 2017 09:27), Max: 36.4 (14 Dec 2017 16:38)  T(F): 96.7 (15 Dec 2017 09:27), Max: 97.5 (14 Dec 2017 16:38)  HR: 64 (15 Dec 2017 08:44) (58 - 84)  BP: 138/64 (15 Dec 2017 08:44) (116/58 - 187/84)  BP(mean): 116 (14 Dec 2017 17:00) (89 - 116)  RR: 16 (15 Dec 2017 08:44) (16 - 28)  SpO2: 97% (15 Dec 2017 08:44) (96% - 100%)  I&O's Summary    14 Dec 2017 07:01  -  15 Dec 2017 07:00  --------------------------------------------------------  IN: 150 mL / OUT: 600 mL / NET: -450 mL    15 Dec 2017 07:01  -  15 Dec 2017 10:21  --------------------------------------------------------  IN: 120 mL / OUT: 0 mL / NET: 120 mL        Physical Exam:  General: AAox3, NAD  Neuro: grossly intact, moves all extremities, strength almost equal bilat in UE and LE R<L, no sensory deficits, no droop/slurred speech  Pulmonary:  Cardiovascular:  Abdominal:  Extremities: warm, palp pulses, no edema, calfs non tender    LABS:                        12.4   14.4  )-----------( 345      ( 15 Dec 2017 07:01 )             37.8     12-15    139  |  104  |  23  ----------------------------<  108<H>  5.0   |  24  |  0.67    Ca    9.7      15 Dec 2017 07:01  Phos  3.3     12-15  Mg     2.0     12-15      73 yo F with HTN, HLD, hypothyroidism with h/o b/l carotid stenosis s/p R CEA 2014, L CEA 2016 with L carotid 90% restenosis s/p L ICA stent with ischemic stroke, R sided weakness post op    Pain control  Regular diet   ISS  Plavix 75, ASA81  SCDs  PT- acute rehab; OT eval  AM labs o/n: RODRIGUE  12/14: contacted [facility] regarding [facility]. PT recs acute rehab given poor balance/stroke dx/lives alone.     amLODIPine   Tablet 5  aspirin  chewable 81  clopidogrel Tablet 75  labetalol Injectable 10 PRN      Allergies    No Known Allergies    Intolerances        Vital Signs Last 24 Hrs  T(C): 35.9 (15 Dec 2017 09:27), Max: 36.4 (14 Dec 2017 16:38)  T(F): 96.7 (15 Dec 2017 09:27), Max: 97.5 (14 Dec 2017 16:38)  HR: 64 (15 Dec 2017 08:44) (58 - 84)  BP: 138/64 (15 Dec 2017 08:44) (116/58 - 187/84)  BP(mean): 116 (14 Dec 2017 17:00) (89 - 116)  RR: 16 (15 Dec 2017 08:44) (16 - 28)  SpO2: 97% (15 Dec 2017 08:44) (96% - 100%)  I&O's Summary    14 Dec 2017 07:01  -  15 Dec 2017 07:00  --------------------------------------------------------  IN: 150 mL / OUT: 600 mL / NET: -450 mL    15 Dec 2017 07:01  -  15 Dec 2017 10:21  --------------------------------------------------------  IN: 120 mL / OUT: 0 mL / NET: 120 mL        Physical Exam:  General: AAox3, NAD  Neuro: grossly intact, moves all extremities, strength almost equal bilat in UE and LE R<L, no sensory deficits, no droop/slurred speech  Pulmonary:  Cardiovascular:  Abdominal:  Extremities: warm, palp pulses, no edema, calfs non tender    LABS:                        12.4   14.4  )-----------( 345      ( 15 Dec 2017 07:01 )             37.8     12-15    139  |  104  |  23  ----------------------------<  108<H>  5.0   |  24  |  0.67    Ca    9.7      15 Dec 2017 07:01  Phos  3.3     12-15  Mg     2.0     12-15      71 yo F with HTN, HLD, hypothyroidism with h/o b/l carotid stenosis s/p R CEA 2014, L CEA 2016 with L carotid 90% restenosis s/p L ICA stent with ischemic stroke, R sided weakness post op, now improving and almost back to normal    Pain control  Regular diet   ISS  Plavix 75, ASA81  DVT ppx with HSQ - cleared with neurology  PT- acute rehab; OT eval  AM labs

## 2017-12-15 NOTE — DIETITIAN INITIAL EVALUATION ADULT. - OTHER INFO
73 yo/female with PMHx HTN, HLD, hypothyroidism, b/l carotid stenosis, admitted for CTA of neck, now s/p L. carotid stent placement c/b acute stroke on 12/12. Pt visited in room, awake, alert, pleasant. Pt reports good appetite and intake PTA, though non-compliance with DASH/TLC diet. Per diet recall, eggs for breakfast, rice, beans, meat for lunch, fish for dinner. Minimal, if any, fruit and vegetable intake and she knows that she needs to increase her intake. Current appetite good, 75% intake reported at breakfast this AM. No N/V/C/D reported at this time. Last BM 12/14. NKFA. No difficulty chewing or swallowing. Skin intact; surgical wound noted. DASH/TLC handout provided, questions answered.

## 2017-12-15 NOTE — DIETITIAN INITIAL EVALUATION ADULT. - SOURCE
Patient checking to see if pharmacy faxed over the prior auth form. Form has been obtained from pharmacy.  Patient advised patient

## 2017-12-16 LAB
ANION GAP SERPL CALC-SCNC: 10 MMOL/L — SIGNIFICANT CHANGE UP (ref 5–17)
BUN SERPL-MCNC: 28 MG/DL — HIGH (ref 7–23)
CALCIUM SERPL-MCNC: 9.6 MG/DL — SIGNIFICANT CHANGE UP (ref 8.4–10.5)
CHLORIDE SERPL-SCNC: 103 MMOL/L — SIGNIFICANT CHANGE UP (ref 96–108)
CO2 SERPL-SCNC: 23 MMOL/L — SIGNIFICANT CHANGE UP (ref 22–31)
CREAT SERPL-MCNC: 0.63 MG/DL — SIGNIFICANT CHANGE UP (ref 0.5–1.3)
GLUCOSE BLDC GLUCOMTR-MCNC: 105 MG/DL — HIGH (ref 70–99)
GLUCOSE BLDC GLUCOMTR-MCNC: 105 MG/DL — HIGH (ref 70–99)
GLUCOSE BLDC GLUCOMTR-MCNC: 134 MG/DL — HIGH (ref 70–99)
GLUCOSE BLDC GLUCOMTR-MCNC: 236 MG/DL — HIGH (ref 70–99)
GLUCOSE BLDC GLUCOMTR-MCNC: 99 MG/DL — SIGNIFICANT CHANGE UP (ref 70–99)
GLUCOSE SERPL-MCNC: 120 MG/DL — HIGH (ref 70–99)
HCT VFR BLD CALC: 36.5 % — SIGNIFICANT CHANGE UP (ref 34.5–45)
HGB BLD-MCNC: 12.1 G/DL — SIGNIFICANT CHANGE UP (ref 11.5–15.5)
MAGNESIUM SERPL-MCNC: 1.8 MG/DL — SIGNIFICANT CHANGE UP (ref 1.6–2.6)
MCHC RBC-ENTMCNC: 28.3 PG — SIGNIFICANT CHANGE UP (ref 27–34)
MCHC RBC-ENTMCNC: 33.2 G/DL — SIGNIFICANT CHANGE UP (ref 32–36)
MCV RBC AUTO: 85.5 FL — SIGNIFICANT CHANGE UP (ref 80–100)
PHOSPHATE SERPL-MCNC: 3.2 MG/DL — SIGNIFICANT CHANGE UP (ref 2.5–4.5)
PLATELET # BLD AUTO: 363 K/UL — SIGNIFICANT CHANGE UP (ref 150–400)
POTASSIUM SERPL-MCNC: 4.3 MMOL/L — SIGNIFICANT CHANGE UP (ref 3.5–5.3)
POTASSIUM SERPL-SCNC: 4.3 MMOL/L — SIGNIFICANT CHANGE UP (ref 3.5–5.3)
RBC # BLD: 4.27 M/UL — SIGNIFICANT CHANGE UP (ref 3.8–5.2)
RBC # FLD: 12.8 % — SIGNIFICANT CHANGE UP (ref 10.3–16.9)
SODIUM SERPL-SCNC: 136 MMOL/L — SIGNIFICANT CHANGE UP (ref 135–145)
WBC # BLD: 15.8 K/UL — HIGH (ref 3.8–10.5)
WBC # FLD AUTO: 15.8 K/UL — HIGH (ref 3.8–10.5)

## 2017-12-16 RX ORDER — MAGNESIUM SULFATE 500 MG/ML
1 VIAL (ML) INJECTION ONCE
Qty: 0 | Refills: 0 | Status: COMPLETED | OUTPATIENT
Start: 2017-12-16 | End: 2017-12-16

## 2017-12-16 RX ADMIN — ONDANSETRON 4 MILLIGRAM(S): 8 TABLET, FILM COATED ORAL at 00:13

## 2017-12-16 RX ADMIN — CLOPIDOGREL BISULFATE 75 MILLIGRAM(S): 75 TABLET, FILM COATED ORAL at 10:51

## 2017-12-16 RX ADMIN — HEPARIN SODIUM 5000 UNIT(S): 5000 INJECTION INTRAVENOUS; SUBCUTANEOUS at 06:30

## 2017-12-16 RX ADMIN — Medication 75 MICROGRAM(S): at 06:30

## 2017-12-16 RX ADMIN — Medication 81 MILLIGRAM(S): at 10:52

## 2017-12-16 RX ADMIN — HEPARIN SODIUM 5000 UNIT(S): 5000 INJECTION INTRAVENOUS; SUBCUTANEOUS at 18:16

## 2017-12-16 RX ADMIN — Medication 100 GRAM(S): at 10:52

## 2017-12-16 RX ADMIN — Medication 10 MILLIGRAM(S): at 20:58

## 2017-12-16 RX ADMIN — ATORVASTATIN CALCIUM 80 MILLIGRAM(S): 80 TABLET, FILM COATED ORAL at 20:57

## 2017-12-16 RX ADMIN — AMLODIPINE BESYLATE 5 MILLIGRAM(S): 2.5 TABLET ORAL at 06:30

## 2017-12-16 RX ADMIN — Medication 4: at 11:14

## 2017-12-17 LAB
ANION GAP SERPL CALC-SCNC: 14 MMOL/L — SIGNIFICANT CHANGE UP (ref 5–17)
BUN SERPL-MCNC: 23 MG/DL — SIGNIFICANT CHANGE UP (ref 7–23)
CALCIUM SERPL-MCNC: 9.3 MG/DL — SIGNIFICANT CHANGE UP (ref 8.4–10.5)
CHLORIDE SERPL-SCNC: 103 MMOL/L — SIGNIFICANT CHANGE UP (ref 96–108)
CO2 SERPL-SCNC: 23 MMOL/L — SIGNIFICANT CHANGE UP (ref 22–31)
CREAT SERPL-MCNC: 0.6 MG/DL — SIGNIFICANT CHANGE UP (ref 0.5–1.3)
GLUCOSE BLDC GLUCOMTR-MCNC: 105 MG/DL — HIGH (ref 70–99)
GLUCOSE BLDC GLUCOMTR-MCNC: 112 MG/DL — HIGH (ref 70–99)
GLUCOSE BLDC GLUCOMTR-MCNC: 120 MG/DL — HIGH (ref 70–99)
GLUCOSE BLDC GLUCOMTR-MCNC: 127 MG/DL — HIGH (ref 70–99)
GLUCOSE SERPL-MCNC: 99 MG/DL — SIGNIFICANT CHANGE UP (ref 70–99)
HCT VFR BLD CALC: 36.4 % — SIGNIFICANT CHANGE UP (ref 34.5–45)
HGB BLD-MCNC: 12 G/DL — SIGNIFICANT CHANGE UP (ref 11.5–15.5)
MAGNESIUM SERPL-MCNC: 2.1 MG/DL — SIGNIFICANT CHANGE UP (ref 1.6–2.6)
MCHC RBC-ENTMCNC: 28.2 PG — SIGNIFICANT CHANGE UP (ref 27–34)
MCHC RBC-ENTMCNC: 33 G/DL — SIGNIFICANT CHANGE UP (ref 32–36)
MCV RBC AUTO: 85.4 FL — SIGNIFICANT CHANGE UP (ref 80–100)
PHOSPHATE SERPL-MCNC: 3 MG/DL — SIGNIFICANT CHANGE UP (ref 2.5–4.5)
PLATELET # BLD AUTO: 360 K/UL — SIGNIFICANT CHANGE UP (ref 150–400)
POTASSIUM SERPL-MCNC: 3.9 MMOL/L — SIGNIFICANT CHANGE UP (ref 3.5–5.3)
POTASSIUM SERPL-SCNC: 3.9 MMOL/L — SIGNIFICANT CHANGE UP (ref 3.5–5.3)
RBC # BLD: 4.26 M/UL — SIGNIFICANT CHANGE UP (ref 3.8–5.2)
RBC # FLD: 12.8 % — SIGNIFICANT CHANGE UP (ref 10.3–16.9)
SODIUM SERPL-SCNC: 140 MMOL/L — SIGNIFICANT CHANGE UP (ref 135–145)
WBC # BLD: 12 K/UL — HIGH (ref 3.8–10.5)
WBC # FLD AUTO: 12 K/UL — HIGH (ref 3.8–10.5)

## 2017-12-17 RX ADMIN — AMLODIPINE BESYLATE 5 MILLIGRAM(S): 2.5 TABLET ORAL at 06:49

## 2017-12-17 RX ADMIN — ONDANSETRON 4 MILLIGRAM(S): 8 TABLET, FILM COATED ORAL at 22:26

## 2017-12-17 RX ADMIN — HEPARIN SODIUM 5000 UNIT(S): 5000 INJECTION INTRAVENOUS; SUBCUTANEOUS at 17:00

## 2017-12-17 RX ADMIN — HEPARIN SODIUM 5000 UNIT(S): 5000 INJECTION INTRAVENOUS; SUBCUTANEOUS at 06:49

## 2017-12-17 RX ADMIN — Medication 10 MILLIGRAM(S): at 21:24

## 2017-12-17 RX ADMIN — Medication 75 MICROGRAM(S): at 06:49

## 2017-12-17 RX ADMIN — CLOPIDOGREL BISULFATE 75 MILLIGRAM(S): 75 TABLET, FILM COATED ORAL at 11:47

## 2017-12-17 RX ADMIN — ATORVASTATIN CALCIUM 80 MILLIGRAM(S): 80 TABLET, FILM COATED ORAL at 21:30

## 2017-12-17 RX ADMIN — Medication 81 MILLIGRAM(S): at 11:47

## 2017-12-17 NOTE — PROGRESS NOTE ADULT - SUBJECTIVE AND OBJECTIVE BOX
o/n RODRIGUE  12/16 PT unable to see. RODRIGUE         73 yo F with HTN, HLD, hypothyroidism with h/o b/l carotid stenosis s/p R CEA 2014, L CEA 2016 with L carotid 90% restenosis s/p L ICA stent with ischemic stroke, R sided weakness post op currently resolving.    pain control  Regular diet   ISS  Plavix 75, ASA81  SCDs  PT- acute rehab; OT eval o/n RODRIGUE  12/16 PT unable to see. RODRIGUE       Medication:   amLODIPine   Tablet 5  aspirin  chewable 81  clopidogrel Tablet 75  heparin  Injectable 5000  labetalol Injectable 10 PRN        Vital Signs Last 24 Hrs  T(C): 36.2 (17 Dec 2017 09:13), Max: 37.2 (17 Dec 2017 05:00)  T(F): 97.1 (17 Dec 2017 09:13), Max: 99 (17 Dec 2017 05:00)  HR: 81 (17 Dec 2017 10:01) (64 - 81)  BP: 159/69 (17 Dec 2017 10:01) (131/68 - 178/86)  BP(mean): --  RR: 14 (17 Dec 2017 10:01) (14 - 16)  SpO2: 95% (17 Dec 2017 10:01) (94% - 98%)  I&O's Summary    16 Dec 2017 07:01  -  17 Dec 2017 07:00  --------------------------------------------------------  IN: 340 mL / OUT: 0 mL / NET: 340 mL    17 Dec 2017 07:01  -  17 Dec 2017 10:05  --------------------------------------------------------  IN: 180 mL / OUT: 0 mL / NET: 180 mL        Physical Exam:  General: AAox3, NAD  Neuro: grossly intact, moves all extremities, strength almost equal bilat in UE and LE R<L, no sensory deficits, no droop/slurred speech. Unable to shrug completely on R side.   Pulmonary: normal work of breathing on RA  Cardiovascular: RRR  Extremities: warm, palp pulses, no edema, calfs non tender      LABS:                        12.0   12.0  )-----------( 360      ( 17 Dec 2017 06:39 )             36.4     12-17    140  |  103  |  23  ----------------------------<  99  3.9   |  23  |  0.60    Ca    9.3      17 Dec 2017 06:39  Phos  3.0     12-17  Mg     2.1     12-17          Radiology and Additional Studies:        73 yo F with HTN, HLD, hypothyroidism with h/o b/l carotid stenosis s/p R CEA 2014, L CEA 2016 with L carotid 90% restenosis s/p L ICA stent with ischemic stroke, R sided weakness post op currently resolving.    - currently awaiting acute rehab; applied to [facility], awaiting insurance auth  - f/u PT-re-eval  pain control  Regular diet   ISS  Plavix 75, ASA81  SCDs  PT- acute rehab; OT eval

## 2017-12-18 LAB
ANION GAP SERPL CALC-SCNC: 14 MMOL/L — SIGNIFICANT CHANGE UP (ref 5–17)
BUN SERPL-MCNC: 22 MG/DL — SIGNIFICANT CHANGE UP (ref 7–23)
CALCIUM SERPL-MCNC: 9.4 MG/DL — SIGNIFICANT CHANGE UP (ref 8.4–10.5)
CHLORIDE SERPL-SCNC: 104 MMOL/L — SIGNIFICANT CHANGE UP (ref 96–108)
CO2 SERPL-SCNC: 23 MMOL/L — SIGNIFICANT CHANGE UP (ref 22–31)
CREAT SERPL-MCNC: 0.62 MG/DL — SIGNIFICANT CHANGE UP (ref 0.5–1.3)
GLUCOSE BLDC GLUCOMTR-MCNC: 102 MG/DL — HIGH (ref 70–99)
GLUCOSE BLDC GLUCOMTR-MCNC: 110 MG/DL — HIGH (ref 70–99)
GLUCOSE BLDC GLUCOMTR-MCNC: 123 MG/DL — HIGH (ref 70–99)
GLUCOSE BLDC GLUCOMTR-MCNC: 126 MG/DL — HIGH (ref 70–99)
GLUCOSE SERPL-MCNC: 119 MG/DL — HIGH (ref 70–99)
HCT VFR BLD CALC: 37 % — SIGNIFICANT CHANGE UP (ref 34.5–45)
HGB BLD-MCNC: 12.1 G/DL — SIGNIFICANT CHANGE UP (ref 11.5–15.5)
MAGNESIUM SERPL-MCNC: 2 MG/DL — SIGNIFICANT CHANGE UP (ref 1.6–2.6)
MCHC RBC-ENTMCNC: 27.9 PG — SIGNIFICANT CHANGE UP (ref 27–34)
MCHC RBC-ENTMCNC: 32.7 G/DL — SIGNIFICANT CHANGE UP (ref 32–36)
MCV RBC AUTO: 85.5 FL — SIGNIFICANT CHANGE UP (ref 80–100)
PHOSPHATE SERPL-MCNC: 2.6 MG/DL — SIGNIFICANT CHANGE UP (ref 2.5–4.5)
PLATELET # BLD AUTO: 390 K/UL — SIGNIFICANT CHANGE UP (ref 150–400)
POTASSIUM SERPL-MCNC: 3.6 MMOL/L — SIGNIFICANT CHANGE UP (ref 3.5–5.3)
POTASSIUM SERPL-SCNC: 3.6 MMOL/L — SIGNIFICANT CHANGE UP (ref 3.5–5.3)
RBC # BLD: 4.33 M/UL — SIGNIFICANT CHANGE UP (ref 3.8–5.2)
RBC # FLD: 12.4 % — SIGNIFICANT CHANGE UP (ref 10.3–16.9)
SODIUM SERPL-SCNC: 141 MMOL/L — SIGNIFICANT CHANGE UP (ref 135–145)
WBC # BLD: 12.4 K/UL — HIGH (ref 3.8–10.5)
WBC # FLD AUTO: 12.4 K/UL — HIGH (ref 3.8–10.5)

## 2017-12-18 RX ORDER — POTASSIUM CHLORIDE 20 MEQ
40 PACKET (EA) ORAL ONCE
Qty: 0 | Refills: 0 | Status: COMPLETED | OUTPATIENT
Start: 2017-12-18 | End: 2017-12-18

## 2017-12-18 RX ADMIN — Medication 75 MICROGRAM(S): at 06:51

## 2017-12-18 RX ADMIN — ATORVASTATIN CALCIUM 80 MILLIGRAM(S): 80 TABLET, FILM COATED ORAL at 21:29

## 2017-12-18 RX ADMIN — Medication 81 MILLIGRAM(S): at 09:43

## 2017-12-18 RX ADMIN — Medication 40 MILLIEQUIVALENT(S): at 09:43

## 2017-12-18 RX ADMIN — HEPARIN SODIUM 5000 UNIT(S): 5000 INJECTION INTRAVENOUS; SUBCUTANEOUS at 06:51

## 2017-12-18 RX ADMIN — HEPARIN SODIUM 5000 UNIT(S): 5000 INJECTION INTRAVENOUS; SUBCUTANEOUS at 17:57

## 2017-12-18 RX ADMIN — AMLODIPINE BESYLATE 5 MILLIGRAM(S): 2.5 TABLET ORAL at 06:51

## 2017-12-18 RX ADMIN — CLOPIDOGREL BISULFATE 75 MILLIGRAM(S): 75 TABLET, FILM COATED ORAL at 09:43

## 2017-12-18 NOTE — PROGRESS NOTE ADULT - SUBJECTIVE AND OBJECTIVE BOX
O/N: RODRIGUE  12/17: awaiting approval for acute rehab (not [facility]); episode of loose stool; roommate had c. diff, cont. to monitor      71 yo F with HTN, HLD, hypothyroidism with h/o b/l carotid stenosis s/p R CEA 2014, L CEA 2016 with L carotid 90% restenosis s/p L ICA stent with ischemic stroke, R sided weakness post op currently resolving.    pain control  Regular diet   ISS  Plavix 75, ASA81  SCDs  PT- acute rehab; OT eval

## 2017-12-19 VITALS — TEMPERATURE: 98 F

## 2017-12-19 LAB
GLUCOSE BLDC GLUCOMTR-MCNC: 103 MG/DL — HIGH (ref 70–99)
GLUCOSE BLDC GLUCOMTR-MCNC: 108 MG/DL — HIGH (ref 70–99)
HCT VFR BLD CALC: 35.9 % — SIGNIFICANT CHANGE UP (ref 34.5–45)
HGB BLD-MCNC: 11.9 G/DL — SIGNIFICANT CHANGE UP (ref 11.5–15.5)
MCHC RBC-ENTMCNC: 28.3 PG — SIGNIFICANT CHANGE UP (ref 27–34)
MCHC RBC-ENTMCNC: 33.1 G/DL — SIGNIFICANT CHANGE UP (ref 32–36)
MCV RBC AUTO: 85.3 FL — SIGNIFICANT CHANGE UP (ref 80–100)
PLATELET # BLD AUTO: 383 K/UL — SIGNIFICANT CHANGE UP (ref 150–400)
RBC # BLD: 4.21 M/UL — SIGNIFICANT CHANGE UP (ref 3.8–5.2)
RBC # FLD: 12.7 % — SIGNIFICANT CHANGE UP (ref 10.3–16.9)
WBC # BLD: 11.6 K/UL — HIGH (ref 3.8–10.5)
WBC # FLD AUTO: 11.6 K/UL — HIGH (ref 3.8–10.5)

## 2017-12-19 PROCEDURE — 84100 ASSAY OF PHOSPHORUS: CPT

## 2017-12-19 PROCEDURE — C1887: CPT

## 2017-12-19 PROCEDURE — 83036 HEMOGLOBIN GLYCOSYLATED A1C: CPT

## 2017-12-19 PROCEDURE — 80061 LIPID PANEL: CPT

## 2017-12-19 PROCEDURE — 83735 ASSAY OF MAGNESIUM: CPT

## 2017-12-19 PROCEDURE — 85730 THROMBOPLASTIN TIME PARTIAL: CPT

## 2017-12-19 PROCEDURE — 97162 PT EVAL MOD COMPLEX 30 MIN: CPT

## 2017-12-19 PROCEDURE — 70498 CT ANGIOGRAPHY NECK: CPT

## 2017-12-19 PROCEDURE — C1889: CPT

## 2017-12-19 PROCEDURE — 80048 BASIC METABOLIC PNL TOTAL CA: CPT

## 2017-12-19 PROCEDURE — 70551 MRI BRAIN STEM W/O DYE: CPT

## 2017-12-19 PROCEDURE — C1725: CPT

## 2017-12-19 PROCEDURE — 85027 COMPLETE CBC AUTOMATED: CPT

## 2017-12-19 PROCEDURE — 36415 COLL VENOUS BLD VENIPUNCTURE: CPT

## 2017-12-19 PROCEDURE — 70496 CT ANGIOGRAPHY HEAD: CPT

## 2017-12-19 PROCEDURE — C1876: CPT

## 2017-12-19 PROCEDURE — C1894: CPT

## 2017-12-19 PROCEDURE — 82962 GLUCOSE BLOOD TEST: CPT

## 2017-12-19 PROCEDURE — C1884: CPT

## 2017-12-19 PROCEDURE — C1769: CPT

## 2017-12-19 PROCEDURE — 85610 PROTHROMBIN TIME: CPT

## 2017-12-19 PROCEDURE — 93005 ELECTROCARDIOGRAM TRACING: CPT

## 2017-12-19 PROCEDURE — 97110 THERAPEUTIC EXERCISES: CPT

## 2017-12-19 PROCEDURE — 97116 GAIT TRAINING THERAPY: CPT

## 2017-12-19 PROCEDURE — 97161 PT EVAL LOW COMPLEX 20 MIN: CPT

## 2017-12-19 PROCEDURE — 70450 CT HEAD/BRAIN W/O DYE: CPT

## 2017-12-19 PROCEDURE — 84484 ASSAY OF TROPONIN QUANT: CPT

## 2017-12-19 PROCEDURE — 92610 EVALUATE SWALLOWING FUNCTION: CPT

## 2017-12-19 PROCEDURE — 85025 COMPLETE CBC W/AUTO DIFF WBC: CPT

## 2017-12-19 RX ORDER — CLOPIDOGREL BISULFATE 75 MG/1
1 TABLET, FILM COATED ORAL
Qty: 30 | Refills: 0 | OUTPATIENT
Start: 2017-12-19 | End: 2018-01-17

## 2017-12-19 RX ORDER — CLOPIDOGREL BISULFATE 75 MG/1
1 TABLET, FILM COATED ORAL
Qty: 30 | Refills: 2 | OUTPATIENT
Start: 2017-12-19 | End: 2018-03-18

## 2017-12-19 RX ORDER — CLOPIDOGREL BISULFATE 75 MG/1
1 TABLET, FILM COATED ORAL
Qty: 0 | Refills: 0 | COMMUNITY
Start: 2017-12-19

## 2017-12-19 RX ADMIN — Medication 81 MILLIGRAM(S): at 11:40

## 2017-12-19 RX ADMIN — AMLODIPINE BESYLATE 5 MILLIGRAM(S): 2.5 TABLET ORAL at 06:10

## 2017-12-19 RX ADMIN — Medication 75 MICROGRAM(S): at 06:10

## 2017-12-19 RX ADMIN — HEPARIN SODIUM 5000 UNIT(S): 5000 INJECTION INTRAVENOUS; SUBCUTANEOUS at 06:10

## 2017-12-19 RX ADMIN — CLOPIDOGREL BISULFATE 75 MILLIGRAM(S): 75 TABLET, FILM COATED ORAL at 11:40

## 2017-12-19 NOTE — PROGRESS NOTE ADULT - PROVIDER SPECIALTY LIST ADULT
Neurology
Neurology
SICU
SICU
Vascular Surgery
Neurology

## 2017-12-19 NOTE — PROGRESS NOTE ADULT - SUBJECTIVE AND OBJECTIVE BOX
24hr Events:  O/N: RODRIGUE, VSS  12/18: RODRIGUE event, Pt does not want to go rehab wants to go home with home PT      Assessment/Plan;  71 yo F with HTN, HLD, hypothyroidism with h/o b/l carotid stenosis s/p R CEA 2014, L CEA 2016 with L carotid 90% restenosis s/p L ICA stent with ischemic stroke, R sided weakness post op currently resolving.    pain control  Regular diet   ISS  Plavix 75, ASA81  SCDs  PT- acute rehab; OT eval 24hr Events:  O/N: RODRIGUE, VSS  12/18: RODRIGUE event, Pt does not want to go rehab wants to go home with home PT  S.  No complaints.     amLODIPine   Tablet 5  aspirin  chewable 81  clopidogrel Tablet 75  heparin  Injectable 5000  labetalol Injectable 10 PRN      Allergies    No Known Allergies    Intolerances        Vital Signs Last 24 Hrs  T(C): 36.7 (19 Dec 2017 08:42), Max: 36.7 (19 Dec 2017 08:42)  T(F): 98 (19 Dec 2017 08:42), Max: 98 (19 Dec 2017 08:42)  HR: 80 (19 Dec 2017 08:50) (71 - 81)  BP: 155/72 (19 Dec 2017 08:50) (137/64 - 167/75)  BP(mean): --  RR: 16 (19 Dec 2017 08:50) (16 - 18)  SpO2: 98% (19 Dec 2017 08:50) (97% - 100%)    Physical Exam:  General: awake, alert, NAD  Pulmonary:  Cardiovascular:  Abdominal:  Extremities:  Pulses:   Right:                                                                           Left:  FEM [ ]2+ [ ]1+ [ ] doppler                                            FEM [ ]2+ [ ]1+ [ ] doppler    POP [ ]2+ [ ]1+ [ ] doppler                                            POP [ ]2+ [ ]1+ [ ] doppler    DP [ ]2+ [ ]1+ [ ] doppler                                               DP [ ]2+ [ ]1+ [ ] doppler  PT[ ]2+ [ ]1+ [ ] doppler                                                 PT [ ]2+ [ ]1+ [ ] doppler      LABS:                        11.9   11.6  )-----------( 383      ( 19 Dec 2017 07:23 )             35.9     12-18    141  |  104  |  22  ----------------------------<  119<H>  3.6   |  23  |  0.62    Ca    9.4      18 Dec 2017 08:29  Phos  2.6     12-18  Mg     2.0     12-18            RADIOLOGY & ADDITIONAL TESTS:      Assessment/Plan;  73 yo F with HTN, HLD, hypothyroidism with h/o b/l carotid stenosis s/p R CEA 2014, L CEA 2016 with L carotid 90% restenosis s/p L ICA stent with ischemic stroke, R sided weakness post op currently resolving.    d/c home with out patient PT.  f/u Dr Gloria in 2 weeks   pain control  Regular diet   ISS  Plavix 75, ASA81  SCDs  PT- acute rehab; OT tin

## 2017-12-22 DIAGNOSIS — I65.29 OCCLUSION AND STENOSIS OF UNSPECIFIED CAROTID ARTERY: ICD-10-CM

## 2017-12-22 DIAGNOSIS — I10 ESSENTIAL (PRIMARY) HYPERTENSION: ICD-10-CM

## 2017-12-22 DIAGNOSIS — E03.9 HYPOTHYROIDISM, UNSPECIFIED: ICD-10-CM

## 2017-12-22 DIAGNOSIS — G81.90 HEMIPLEGIA, UNSPECIFIED AFFECTING UNSPECIFIED SIDE: ICD-10-CM

## 2017-12-22 DIAGNOSIS — E55.9 VITAMIN D DEFICIENCY, UNSPECIFIED: ICD-10-CM

## 2017-12-22 DIAGNOSIS — K29.70 GASTRITIS, UNSPECIFIED, WITHOUT BLEEDING: ICD-10-CM

## 2017-12-22 DIAGNOSIS — I97.821 POSTPROCEDURAL CEREBROVASCULAR INFARCTION FOLLOWING OTHER SURGERY: ICD-10-CM

## 2017-12-22 DIAGNOSIS — I65.22 OCCLUSION AND STENOSIS OF LEFT CAROTID ARTERY: ICD-10-CM

## 2017-12-22 DIAGNOSIS — E78.00 PURE HYPERCHOLESTEROLEMIA, UNSPECIFIED: ICD-10-CM

## 2018-01-10 ENCOUNTER — APPOINTMENT (OUTPATIENT)
Dept: VASCULAR SURGERY | Facility: CLINIC | Age: 73
End: 2018-01-10
Payer: MEDICAID

## 2018-01-10 VITALS — HEART RATE: 72 BPM | DIASTOLIC BLOOD PRESSURE: 83 MMHG | SYSTOLIC BLOOD PRESSURE: 145 MMHG | OXYGEN SATURATION: 94 %

## 2018-01-10 DIAGNOSIS — R53.1 WEAKNESS: ICD-10-CM

## 2018-01-10 PROCEDURE — 99024 POSTOP FOLLOW-UP VISIT: CPT

## 2018-01-10 PROCEDURE — 93880 EXTRACRANIAL BILAT STUDY: CPT

## 2018-01-10 RX ORDER — CLOPIDOGREL BISULFATE 75 MG/1
75 TABLET, FILM COATED ORAL DAILY
Qty: 30 | Refills: 5 | Status: ACTIVE | COMMUNITY
Start: 2018-01-10 | End: 1900-01-01

## 2018-01-12 NOTE — PHYSICAL THERAPY INITIAL EVALUATION ADULT - WEIGHT-BEARING RESTRICTIONS: STAND/SIT, REHAB EVAL
"  Physical Therapy Daily Treatment    Visit Count: 5    Plan of Care Dates: Initial: 12/29/2017 Through: 3/23/2018  Â 30 day reassessment by 1-29-18 or G-codes by visit 10  Insurance Information: Jennifer De La Torre  ID#: [id number]  Â   NO AUTHORIZATION REQUIRED  $40.00 CO-PAY, APPLIES TO OUT OF POCKET  ALL VISITS BASED ON MEDICAL NECESSITY  REFERENCE # [id number] N Q IWP  Â Next Referring Provider Visit: 1-10-17  Â   Referred by: Su Gallo MD  Medical Diagnosis (from order): M25.561, G89.29 Chronic pain of right knee  M17.11 Primary osteoarthritis of right knee  Z96.651 Status post total right knee replacement   Treatment Diagnosis: Knee Symptoms with Pain, Impaired Posture, Impaired Joint Mobility, Impaired Range of Motion, Impaired Motor Function/Muscle Performance, Impaired Gait/Locomotion Deficits, Impaired Mobility and Impaired Balance  Insurance: 1. [employer] MEDICARE  2. N/A  Â   Date of Surgery: 12-26-17; Surgery performed: Quadriceps-sparing right total knee arthroplasty, cemented, polyethylene tibial/patella liner on metal femur/tibia; Physician Guidelines: yes  Â 4 weeks on 1-23-18  Diagnosis Precautions: Weight Bearing As Tolerated Right Lower Extremity  Chart reviewed: Relevant co-morbidities, allergies, tests and medications:   Medical history: hyperlipidemia, surgical- gall bladder removal, appendectomy, hernia repair   Tests: MRI and x ray of right knee prior to surgical intervention, x-ray after surgery also  Medication: hydrocodone for pain management     SUBJECTIVE   [facility] reports she feels better than the start of the last session. Exercises are going, likes the backwards walking and feeling that stretch. Kelvin Cabezas was still too tall for her to do the one stretch, just not ready for it. Walking along the counter. Bandage is now off, x-rays look good.    Current Pain (0-10 scale): 3Â   Functional Change: ""feeling a little better\""    OBJECTIVE   Posture/Observation:  Incision well healed, no " "visible signs infection, tender to palpation, requires increased mobility (right knee)     Range of Motion (degrees)  Â  Norm Left Right Right Right Right   Date Â  Initial Initial 1-4-18 1-5-18 1-12-18   Knee Flexion 135Â  121 55* supine 94 101 with green stretch strap in supine 105 Passive supine  90 seated   Knee Extension 0-5 3 hyper Lacking 8* Â  Lacking 7 dg Lacking 7   standard testing positions unless otherwise noted; Key: ranges are reported in active range of motion unless noted as AA=active assistive or P=passive range of motion, * denotes painÂ   Comments: Only those motions that were assessed are noted. Gait Analysis:  Patient ambulating independently with front two wheeled walker. Flat footed gait on right LE with step to pattern. Antalgic pattern.  Knee remains partially flexed in stance phase on right   Â   Palpation:  Significantly tender to the right medial quadricep and suprapatellar area    Treatment   Manual Therapy: 5 minutes (15 total)  -Patellar mobilization   -Soft tissue mobilization in supine with ankle propped on half foam roll to distal hamstring and proximal gastroc (non billed completed by rehab tech under direction of PT x 10 minutes)   *all completed on right knee     Therapeutic Exercise: 45 minutes   Attempted the recumbent cycle, secondary to short stature and knee ROM restrictions, unable to find success on this date  Nustep: 7 minutes, resistance level 7 using upper and lower extremities  Terminal extension blue TB x 10 (cues to not retro lean, hands on tactile cues throughout)   Hamstring curl standing x 10   Exercise Initial Evaluation 1-4-18 1-5-18 1-9-18 1-12-18   Gastroc stretch Verbal with handout provided Â   Verbal review to continue with new HEP hand out  Incline board 3 x 30 seconds    Hamstring stretch Verbal with handout provided Â  Standing on 8"" step,   5x 15 seconds Completed manually  3 x 30 seconds   Quad set X 10  Â  x10   Quad set + SLR     x15 with NMES in long " "sitting Â     Heel slide X 10  3x10   Last 2 sets with green stretch strap 3x10   Last 2 sets with green stretch strap X 10     Long arc quadricep X 5  Â  2x10  3# x 10  Seated, Leaning cues  NK table   2.5# x 10   Â Short Arc Quad  Â  Â  x10 with small cylinder bolster    x15 with NMES 3# x 10   Supine, red ball under knee     Â Step Up Â  Â  2x10 leading with right on 4"" step, mirela UE support Â  4 inch leading right one UE support, forward x 10   4 inch leading right bilateral support, lateral x 10   6 inch bilateral UE support    Quad stretch Â  Â  5x 15 seconds 2 x 30 seconds  Dorsum of foot on stool  2 x 30 seconds  Dorsum of foot on stool    Sit to stand Â  Â   X 10   No UE support, WB cues    SL balance     X 5 with UE support     Comments: completed on right LE, unless otherwise stated   Â   Home Program:  Exercise: Date issued Date DC Comments   Gluteal/quad set, hip abduction, heel slide, ankle pumps  Inpatient Â  Â    Gastroc/hamstring stretch 12-29-17 Â  Â    -Continue gastroc/hamstring stretch  -Chair squat, no UE support  -Quad stretch with ottoman   -Single leg balance  -Retro ambulation  1-9-18 Â  Heat to posterior knee as long as swelling does not increase     ASSESSMENT   \""Yamilet\"" presents with surgical bandage removed, incision looks great, does need some work to improve mobility. Slight gains made into flexion/extension ROM. Remains painful to end ranges and tender to medial quad/hamstring and patella mobility. Session focused on progressive strengthening with emphasis on achieving terminal extension via quad engagement and posterior flexibility. Tolerates session well but fatigues quickly with step up activities relying on increased UE support. Continues to require skilled physical therapy in order to reduce pain and edema, increase range of motion and strengthening in order to restore functional mobility to include balance/proprioception of the right LE.      Pain after treatment (0-10 scale): 3  Result of above " outlined education: Verbalizes understanding, Demonstrates understanding and Needs reinforcement         Goals: To be obtained by end of this plan of care:  1. Patient independent with modified and progressed home exercise program.  2. Patient will decrease involved knee pain/symptoms to less than 1/10  to aid in normalization of gait for independent living. 3. Patient will increase involved knee active range of motion to 120Â° flexion and at least 0 extension to aid in completing transfers including low and soft surfaces and completion of household tasks for independent living. 4. Patient will increase involved knee and hip strength to 5/5 to aid in stair ambulation, ambulating a curb step and squatting for lifting for household independent living. 5. Patient will demonstrate ability to negotiate level and unlevel surfaces at variable velocities, including change of direction without increased pain or instability to return to age appropriate and community activities at prior level of function. 6. Patient will be able to ascend and descend 1 flight of stairs using reciprocal pattern with minimal pain/difficulty. 7. Lower Extremity Functional Scale: Patient will complete form to reflect an improved score from initial score of 16 to greater than or equal to 55 (0=extreme difficulty; 80=no difficulty) to indicate pt reported improvement in function/disability/impairment (minimal detectable change: 9 points). PLAN   NuStep with increased resistance, maybe trial bike by OT area, progress range of motion and strength as tolerated, patellar mobility, and scar mobility    THERAPY DAILY BILLING   Insurance: [employer] MEDICARE 2.  N/A    Evaluation Procedures:  No evaluation codes were used on this date of service    Timed Procedures:  Manual Therapy, 15 minutes  Therapeutic Exercise, 45 minutes   10' manual not billed     Untimed Procedures:  No untimed codes were used on this date of service    Total Treatment Time: 60 minutes        G-Code:  G-Code Score ABN form  reporting not required this treatment session  Modifier based on outcome measure(s)/functional testing/clinical judgement as listed above    The referring provider's electronic or written signature on the evaluation authorizes the therapy plan of care and certifies the need for these services, furnished under this plan of care while under their care. Physician Signature on file. full weight-bearing

## 2018-06-27 ENCOUNTER — APPOINTMENT (OUTPATIENT)
Dept: VASCULAR SURGERY | Facility: CLINIC | Age: 73
End: 2018-06-27
Payer: MEDICARE

## 2018-06-27 PROCEDURE — 99214 OFFICE O/P EST MOD 30 MIN: CPT

## 2018-06-27 PROCEDURE — 93880 EXTRACRANIAL BILAT STUDY: CPT

## 2018-06-27 RX ORDER — PREGABALIN 150 MG/1
150 CAPSULE ORAL
Qty: 30 | Refills: 0 | Status: ACTIVE | COMMUNITY
Start: 2018-02-12

## 2018-06-27 RX ORDER — PREGABALIN 100 MG/1
100 CAPSULE ORAL
Qty: 30 | Refills: 0 | Status: ACTIVE | COMMUNITY
Start: 2018-01-12

## 2018-06-27 RX ORDER — MUPIROCIN 20 MG/G
2 OINTMENT TOPICAL
Qty: 22 | Refills: 0 | Status: ACTIVE | COMMUNITY
Start: 2018-05-17

## 2018-06-27 RX ORDER — AMLODIPINE BESYLATE 5 MG/1
5 TABLET ORAL
Qty: 30 | Refills: 0 | Status: ACTIVE | COMMUNITY
Start: 2018-04-26

## 2018-06-27 RX ORDER — TIZANIDINE 4 MG/1
4 TABLET ORAL
Qty: 7 | Refills: 0 | Status: ACTIVE | COMMUNITY
Start: 2018-05-10

## 2018-06-27 RX ORDER — DICLOFENAC SODIUM 75 MG/1
75 TABLET, DELAYED RELEASE ORAL
Qty: 60 | Refills: 0 | Status: ACTIVE | COMMUNITY
Start: 2018-04-26

## 2018-06-27 RX ORDER — SIMVASTATIN 20 MG/1
20 TABLET, FILM COATED ORAL
Qty: 30 | Refills: 0 | Status: ACTIVE | COMMUNITY
Start: 2018-05-10

## 2018-06-27 RX ORDER — METHYLPREDNISOLONE 4 MG/1
4 TABLET ORAL
Qty: 21 | Refills: 0 | Status: ACTIVE | COMMUNITY
Start: 2018-05-21

## 2018-06-27 RX ORDER — OXYCODONE AND ACETAMINOPHEN 5; 325 MG/1; MG/1
5-325 TABLET ORAL
Qty: 15 | Refills: 0 | Status: ACTIVE | COMMUNITY
Start: 2018-04-17

## 2018-06-27 RX ORDER — ALENDRONATE SODIUM 70 MG/1
70 TABLET ORAL
Qty: 4 | Refills: 0 | Status: ACTIVE | COMMUNITY
Start: 2018-04-26

## 2018-07-10 ENCOUNTER — FORM ENCOUNTER (OUTPATIENT)
Age: 73
End: 2018-07-10

## 2018-07-11 ENCOUNTER — OUTPATIENT (OUTPATIENT)
Dept: OUTPATIENT SERVICES | Facility: HOSPITAL | Age: 73
LOS: 1 days | End: 2018-07-11
Payer: MEDICARE

## 2018-07-11 ENCOUNTER — APPOINTMENT (OUTPATIENT)
Dept: CT IMAGING | Facility: HOSPITAL | Age: 73
End: 2018-07-11
Payer: MEDICARE

## 2018-07-11 DIAGNOSIS — Z98.89 OTHER SPECIFIED POSTPROCEDURAL STATES: Chronic | ICD-10-CM

## 2018-07-11 DIAGNOSIS — Z98.890 OTHER SPECIFIED POSTPROCEDURAL STATES: Chronic | ICD-10-CM

## 2018-07-11 DIAGNOSIS — Z98.51 TUBAL LIGATION STATUS: Chronic | ICD-10-CM

## 2018-07-11 PROCEDURE — 70498 CT ANGIOGRAPHY NECK: CPT

## 2018-07-11 PROCEDURE — 70498 CT ANGIOGRAPHY NECK: CPT | Mod: 26

## 2018-07-16 PROBLEM — B02.29 OTHER POSTHERPETIC NERVOUS SYSTEM INVOLVEMENT: Chronic | Status: INACTIVE | Noted: 2017-12-12 | Resolved: 2017-12-12

## 2018-08-30 PROBLEM — I65.21 OCCLUSION AND STENOSIS OF RIGHT CAROTID ARTERY: Chronic | Status: ACTIVE | Noted: 2017-12-12

## 2018-08-30 PROBLEM — E03.9 HYPOTHYROIDISM, UNSPECIFIED: Chronic | Status: ACTIVE | Noted: 2017-12-12

## 2018-08-30 PROBLEM — B02.9 ZOSTER WITHOUT COMPLICATIONS: Chronic | Status: ACTIVE | Noted: 2017-12-12

## 2018-09-03 NOTE — H&P ADULT - HISTORY OF PRESENT ILLNESS
Patient is a 74 yo F with HTN, HLD, hypothyroidism, and h/o bilateral carotid stenosis s/p R CEA (3/2014) and L CEA (4/2016), L ICA stent placement for recurrent L CEA re-stenosis (12/12/2017, 8x30 mm Wallstent, complicated by CVA with residual R-sided weakness)  now found to have restenosis in left carotid artery seen on office duplex (Proximal stent velocity 148, distal stent velocity 585/166) and CTA neck (moderate stenosis in the proximal and mid-stent, significant in-stent restenosis in the distal stent).     She presents today for elective left carotid angiogram with stent placemen. Neuro-exam is positive for residual R-sided weakness, no paresthesias, no facial droop, no slurred speech. No fevers, no nausea, no CP/SOA.

## 2018-09-03 NOTE — H&P ADULT - NSHPREVIEWOFSYSTEMS_GEN_ALL_CORE
REVIEW OF SYSTEMS      General:	Neg.     Skin/Breast: No ulcers or lesions  	  Ophthalmologic: No visiual changes  	  ENMT:	Neg    Respiratory and Thorax: Neg. No SOB or chest pain   	  Cardiovascular:	Neg. No CRAWFORD. No CP.     Gastrointestinal:	Neg    Genitourinary:	Neg    Musculoskeletal:	No arthralgias. No muscle pain. Weakness as per Neuroo    Neurological: No visual changes. No dizziness. Weakness in R LE and RUE. Ambulates with a cane. 	    Psychiatric: No depression 	        Endocrine:	    Allergic/Immunologic:

## 2018-09-03 NOTE — H&P ADULT - ASSESSMENT
72 yo F with HTN, b.l ICA stenosis now with in-stent restenosis of left carotid stent  s/p L CEA in 4/2016 and DANGELO in 12/2017    Plan:  - Admit to vascular surgery for SDA for L ICA angiogram and revascularization.     - Dual antiplatelets  - DVT PPX  - NPO/IVF/ Labs.     Routine pre-operative care.

## 2018-09-03 NOTE — H&P ADULT - NSHPPHYSICALEXAM_GEN_ALL_CORE
Pre op:  Gen: AAOx3, NAD, resting comfortably  HEENT: NC/AT, EOMI  Lungs: normal resp effort  Heart: NSR   Abd: soft, NT/ND  Extr: 5/5 strength x in L UE and LLE. 4/5 in RUE and RLE,, no clubbing/cyanosis/edema  Neuro: A/O x 3, normal motor/sensation, CNs II-XII grossly intact.   skin: no rashes or lesions  Psych: normal, calm

## 2018-09-03 NOTE — H&P ADULT - PSH
Carotid stent occlusion, initial encounter  L ICA in stent-restenosis  History of hemorrhoidectomy    History of tubal ligation    History of vascular surgery  I ICA stenting 12/2017  S/P carotid endarterectomy  right: 3/2014, left: 4/2016

## 2018-09-04 ENCOUNTER — APPOINTMENT (OUTPATIENT)
Dept: VASCULAR SURGERY | Facility: HOSPITAL | Age: 73
End: 2018-09-04

## 2018-09-04 ENCOUNTER — INPATIENT (INPATIENT)
Facility: HOSPITAL | Age: 73
LOS: 0 days | Discharge: HOME | DRG: 38 | End: 2018-09-05
Attending: SURGERY | Admitting: SURGERY
Payer: MEDICARE

## 2018-09-04 VITALS
OXYGEN SATURATION: 98 % | TEMPERATURE: 99 F | SYSTOLIC BLOOD PRESSURE: 123 MMHG | RESPIRATION RATE: 14 BRPM | HEART RATE: 73 BPM | DIASTOLIC BLOOD PRESSURE: 56 MMHG

## 2018-09-04 DIAGNOSIS — Z98.890 OTHER SPECIFIED POSTPROCEDURAL STATES: Chronic | ICD-10-CM

## 2018-09-04 DIAGNOSIS — Z98.51 TUBAL LIGATION STATUS: Chronic | ICD-10-CM

## 2018-09-04 DIAGNOSIS — Z98.89 OTHER SPECIFIED POSTPROCEDURAL STATES: Chronic | ICD-10-CM

## 2018-09-04 DIAGNOSIS — T82.898A OTHER SPECIFIED COMPLICATION OF VASCULAR PROSTHETIC DEVICES, IMPLANTS AND GRAFTS, INITIAL ENCOUNTER: Chronic | ICD-10-CM

## 2018-09-04 LAB
ANION GAP SERPL CALC-SCNC: 15 MMOL/L — SIGNIFICANT CHANGE UP (ref 5–17)
APTT BLD: 31.8 SEC — SIGNIFICANT CHANGE UP (ref 27.5–37.4)
BASOPHILS NFR BLD AUTO: 0.2 % — SIGNIFICANT CHANGE UP (ref 0–2)
BUN SERPL-MCNC: 20 MG/DL — SIGNIFICANT CHANGE UP (ref 7–23)
CALCIUM SERPL-MCNC: 10.2 MG/DL — SIGNIFICANT CHANGE UP (ref 8.4–10.5)
CHLORIDE SERPL-SCNC: 100 MMOL/L — SIGNIFICANT CHANGE UP (ref 96–108)
CO2 SERPL-SCNC: 26 MMOL/L — SIGNIFICANT CHANGE UP (ref 22–31)
CREAT SERPL-MCNC: 0.68 MG/DL — SIGNIFICANT CHANGE UP (ref 0.5–1.3)
EOSINOPHIL NFR BLD AUTO: 1.7 % — SIGNIFICANT CHANGE UP (ref 0–6)
GLUCOSE SERPL-MCNC: 97 MG/DL — SIGNIFICANT CHANGE UP (ref 70–99)
HCT VFR BLD CALC: 40.3 % — SIGNIFICANT CHANGE UP (ref 34.5–45)
HGB BLD-MCNC: 13.1 G/DL — SIGNIFICANT CHANGE UP (ref 11.5–15.5)
INR BLD: 1.01 — SIGNIFICANT CHANGE UP (ref 0.88–1.16)
LYMPHOCYTES # BLD AUTO: 20.1 % — SIGNIFICANT CHANGE UP (ref 13–44)
MCHC RBC-ENTMCNC: 27.3 PG — SIGNIFICANT CHANGE UP (ref 27–34)
MCHC RBC-ENTMCNC: 32.5 G/DL — SIGNIFICANT CHANGE UP (ref 32–36)
MCV RBC AUTO: 84.1 FL — SIGNIFICANT CHANGE UP (ref 80–100)
MONOCYTES NFR BLD AUTO: 9.7 % — SIGNIFICANT CHANGE UP (ref 2–14)
NEUTROPHILS NFR BLD AUTO: 68.3 % — SIGNIFICANT CHANGE UP (ref 43–77)
PLATELET # BLD AUTO: 385 K/UL — SIGNIFICANT CHANGE UP (ref 150–400)
POTASSIUM SERPL-MCNC: 3.7 MMOL/L — SIGNIFICANT CHANGE UP (ref 3.5–5.3)
POTASSIUM SERPL-SCNC: 3.7 MMOL/L — SIGNIFICANT CHANGE UP (ref 3.5–5.3)
PROTHROM AB SERPL-ACNC: 11.2 SEC — SIGNIFICANT CHANGE UP (ref 9.8–12.7)
RBC # BLD: 4.79 M/UL — SIGNIFICANT CHANGE UP (ref 3.8–5.2)
RBC # FLD: 13.7 % — SIGNIFICANT CHANGE UP (ref 10.3–16.9)
SODIUM SERPL-SCNC: 141 MMOL/L — SIGNIFICANT CHANGE UP (ref 135–145)
WBC # BLD: 13.2 K/UL — HIGH (ref 3.8–10.5)
WBC # FLD AUTO: 13.2 K/UL — HIGH (ref 3.8–10.5)

## 2018-09-04 PROCEDURE — 37216 TRANSCATH STENT CCA W/O EPS: CPT | Mod: GC

## 2018-09-04 RX ORDER — PANTOPRAZOLE SODIUM 20 MG/1
40 TABLET, DELAYED RELEASE ORAL
Qty: 0 | Refills: 0 | Status: DISCONTINUED | OUTPATIENT
Start: 2018-09-04 | End: 2018-09-05

## 2018-09-04 RX ORDER — SIMVASTATIN 20 MG/1
20 TABLET, FILM COATED ORAL AT BEDTIME
Qty: 0 | Refills: 0 | Status: DISCONTINUED | OUTPATIENT
Start: 2018-09-04 | End: 2018-09-05

## 2018-09-04 RX ORDER — CLOPIDOGREL BISULFATE 75 MG/1
75 TABLET, FILM COATED ORAL DAILY
Qty: 0 | Refills: 0 | Status: DISCONTINUED | OUTPATIENT
Start: 2018-09-05 | End: 2018-09-05

## 2018-09-04 RX ORDER — AMLODIPINE BESYLATE 2.5 MG/1
5 TABLET ORAL DAILY
Qty: 0 | Refills: 0 | Status: DISCONTINUED | OUTPATIENT
Start: 2018-09-05 | End: 2018-09-05

## 2018-09-04 RX ORDER — LEVOTHYROXINE SODIUM 125 MCG
88 TABLET ORAL DAILY
Qty: 0 | Refills: 0 | Status: DISCONTINUED | OUTPATIENT
Start: 2018-09-05 | End: 2018-09-05

## 2018-09-04 RX ORDER — ASPIRIN/CALCIUM CARB/MAGNESIUM 324 MG
81 TABLET ORAL DAILY
Qty: 0 | Refills: 0 | Status: DISCONTINUED | OUTPATIENT
Start: 2018-09-05 | End: 2018-09-05

## 2018-09-04 RX ORDER — ACETAMINOPHEN 500 MG
650 TABLET ORAL EVERY 6 HOURS
Qty: 0 | Refills: 0 | Status: DISCONTINUED | OUTPATIENT
Start: 2018-09-04 | End: 2018-09-05

## 2018-09-04 RX ORDER — DOCUSATE SODIUM 100 MG
100 CAPSULE ORAL THREE TIMES A DAY
Qty: 0 | Refills: 0 | Status: DISCONTINUED | OUTPATIENT
Start: 2018-09-04 | End: 2018-09-05

## 2018-09-04 RX ORDER — CHLORHEXIDINE GLUCONATE 213 G/1000ML
1 SOLUTION TOPICAL ONCE
Qty: 0 | Refills: 0 | Status: DISCONTINUED | OUTPATIENT
Start: 2018-09-04 | End: 2018-09-05

## 2018-09-04 RX ORDER — LISINOPRIL 2.5 MG/1
40 TABLET ORAL DAILY
Qty: 0 | Refills: 0 | Status: DISCONTINUED | OUTPATIENT
Start: 2018-09-05 | End: 2018-09-05

## 2018-09-04 RX ORDER — CEFAZOLIN SODIUM 1 G
1000 VIAL (EA) INJECTION EVERY 8 HOURS
Qty: 0 | Refills: 0 | Status: COMPLETED | OUTPATIENT
Start: 2018-09-04 | End: 2018-09-05

## 2018-09-04 RX ORDER — SODIUM CHLORIDE 9 MG/ML
1000 INJECTION INTRAMUSCULAR; INTRAVENOUS; SUBCUTANEOUS
Qty: 0 | Refills: 0 | Status: DISCONTINUED | OUTPATIENT
Start: 2018-09-04 | End: 2018-09-05

## 2018-09-04 RX ADMIN — SODIUM CHLORIDE 50 MILLILITER(S): 9 INJECTION INTRAMUSCULAR; INTRAVENOUS; SUBCUTANEOUS at 13:50

## 2018-09-04 RX ADMIN — Medication 100 MILLIGRAM(S): at 22:08

## 2018-09-04 RX ADMIN — Medication 150 MILLIGRAM(S): at 22:08

## 2018-09-04 RX ADMIN — Medication 650 MILLIGRAM(S): at 23:18

## 2018-09-04 RX ADMIN — SODIUM CHLORIDE 50 MILLILITER(S): 9 INJECTION INTRAMUSCULAR; INTRAVENOUS; SUBCUTANEOUS at 23:04

## 2018-09-04 RX ADMIN — SIMVASTATIN 20 MILLIGRAM(S): 20 TABLET, FILM COATED ORAL at 22:08

## 2018-09-04 NOTE — BRIEF OPERATIVE NOTE - PRE-OP DX
Stenosis of left carotid artery  09/04/2018  In stent restenosis of L distal CCA and proximal ICA  Active  Katlyn Banda

## 2018-09-04 NOTE — PROGRESS NOTE ADULT - SUBJECTIVE AND OBJECTIVE BOX
POST-OPERATIVE NOTE    Procedure:    Diagnosis/Indication:    Surgeon:    S: Pt has no complaints. Denies CP, SOB, CRAWFORD, calf tenderness. Pain controlled with medication.    O:  T(C): --  T(F): --  HR: --  BP: --  RR: --  SpO2: --  Wt(kg): --                        13.1   13.2  )-----------( 385      ( 04 Sep 2018 09:33 )             40.3     09-04    141  |  100  |  20  ----------------------------<  97  3.7   |  26  |  0.68    Ca    10.2      04 Sep 2018 09:33        Gen: NAD, resting comfortably in bed  C/V: NSR  Pulm: Nonlabored breathing, no respiratory distress  Abd: soft, NT/ND  Extrem: Rt groin- sheath in place act- 169      A/P: 73yFemale s/p above procedure  Diet: CLD  IVF: ns@50  Pain/nausea control  f/u labs  recheck act in an hour POST-OPERATIVE NOTE    Procedure: L caortid angiogram, stenting (8x60 mm EV3 Everflex stent) and angioplasty with 5x80 mustang balloon    Diagnosis/Indication: Left carotid artery stenosis    Surgeon: Juani RIVAS: Pt has no complaints. Denies CP, SOB, N/V. Pain controlled with medication.    O:  T(C): --  T(F): --  HR: --  BP: --  RR: --  SpO2: --  Wt(kg): --                        13.1   13.2  )-----------( 385      ( 04 Sep 2018 09:33 )             40.3     09-04    141  |  100  |  20  ----------------------------<  97  3.7   |  26  |  0.68    Ca    10.2      04 Sep 2018 09:33        Gen: NAD, resting comfortably in bed  C/V: NSR  Pulm: Nonlabored breathing, no respiratory distress  Abd: soft, NT/ND  Extrem: Rt groin- sheath in place act- 169      A/P: 73yFemale s/p above procedure  Diet: CLD  IVF: ns@50  Pain/nausea control  f/u labs  recheck act in an hour

## 2018-09-04 NOTE — BRIEF OPERATIVE NOTE - PROCEDURE
<<-----Click on this checkbox to enter Procedure Vascular surgery procedure  09/04/2018  L caortid angiogram, stenting (8x60 mm EV3 Everflex stent) and angioplasty with 5x80 mustang balloon  Active  MVISMER

## 2018-09-04 NOTE — BRIEF OPERATIVE NOTE - POST-OP DX
Left carotid artery stenosis  09/04/2018  In stent restenosis of L distal CCA and proximal ICA  Active  Katlyn Banda

## 2018-09-05 ENCOUNTER — TRANSCRIPTION ENCOUNTER (OUTPATIENT)
Age: 73
End: 2018-09-05

## 2018-09-05 VITALS — TEMPERATURE: 99 F

## 2018-09-05 LAB
ANION GAP SERPL CALC-SCNC: 11 MMOL/L — SIGNIFICANT CHANGE UP (ref 5–17)
BUN SERPL-MCNC: 18 MG/DL — SIGNIFICANT CHANGE UP (ref 7–23)
CALCIUM SERPL-MCNC: 9.1 MG/DL — SIGNIFICANT CHANGE UP (ref 8.4–10.5)
CHLORIDE SERPL-SCNC: 105 MMOL/L — SIGNIFICANT CHANGE UP (ref 96–108)
CO2 SERPL-SCNC: 24 MMOL/L — SIGNIFICANT CHANGE UP (ref 22–31)
CREAT SERPL-MCNC: 0.75 MG/DL — SIGNIFICANT CHANGE UP (ref 0.5–1.3)
GLUCOSE SERPL-MCNC: 107 MG/DL — HIGH (ref 70–99)
HCT VFR BLD CALC: 35.7 % — SIGNIFICANT CHANGE UP (ref 34.5–45)
HGB BLD-MCNC: 11.3 G/DL — LOW (ref 11.5–15.5)
MAGNESIUM SERPL-MCNC: 2.1 MG/DL — SIGNIFICANT CHANGE UP (ref 1.6–2.6)
MCHC RBC-ENTMCNC: 26.8 PG — LOW (ref 27–34)
MCHC RBC-ENTMCNC: 31.7 G/DL — LOW (ref 32–36)
MCV RBC AUTO: 84.6 FL — SIGNIFICANT CHANGE UP (ref 80–100)
PHOSPHATE SERPL-MCNC: 3.1 MG/DL — SIGNIFICANT CHANGE UP (ref 2.5–4.5)
PLATELET # BLD AUTO: 310 K/UL — SIGNIFICANT CHANGE UP (ref 150–400)
POTASSIUM SERPL-MCNC: 4.4 MMOL/L — SIGNIFICANT CHANGE UP (ref 3.5–5.3)
POTASSIUM SERPL-SCNC: 4.4 MMOL/L — SIGNIFICANT CHANGE UP (ref 3.5–5.3)
RBC # BLD: 4.22 M/UL — SIGNIFICANT CHANGE UP (ref 3.8–5.2)
RBC # FLD: 13.8 % — SIGNIFICANT CHANGE UP (ref 10.3–16.9)
SODIUM SERPL-SCNC: 140 MMOL/L — SIGNIFICANT CHANGE UP (ref 135–145)
WBC # BLD: 11.9 K/UL — HIGH (ref 3.8–10.5)
WBC # FLD AUTO: 11.9 K/UL — HIGH (ref 3.8–10.5)

## 2018-09-05 RX ORDER — INFLUENZA VIRUS VACCINE 15; 15; 15; 15 UG/.5ML; UG/.5ML; UG/.5ML; UG/.5ML
0.5 SUSPENSION INTRAMUSCULAR ONCE
Qty: 0 | Refills: 0 | Status: DISCONTINUED | OUTPATIENT
Start: 2018-09-05 | End: 2018-09-05

## 2018-09-05 RX ORDER — SIMVASTATIN 20 MG/1
1 TABLET, FILM COATED ORAL
Qty: 0 | Refills: 0 | COMMUNITY

## 2018-09-05 RX ORDER — AMLODIPINE BESYLATE 2.5 MG/1
1 TABLET ORAL
Qty: 0 | Refills: 0 | COMMUNITY

## 2018-09-05 RX ORDER — LEVOTHYROXINE SODIUM 125 MCG
1 TABLET ORAL
Qty: 0 | Refills: 0 | COMMUNITY

## 2018-09-05 RX ORDER — RAMIPRIL 5 MG
1 CAPSULE ORAL
Qty: 0 | Refills: 0 | COMMUNITY

## 2018-09-05 RX ORDER — LISINOPRIL 2.5 MG/1
1 TABLET ORAL
Qty: 30 | Refills: 0 | OUTPATIENT
Start: 2018-09-05 | End: 2018-10-04

## 2018-09-05 RX ADMIN — Medication 81 MILLIGRAM(S): at 12:39

## 2018-09-05 RX ADMIN — Medication 100 MILLIGRAM(S): at 14:29

## 2018-09-05 RX ADMIN — AMLODIPINE BESYLATE 5 MILLIGRAM(S): 2.5 TABLET ORAL at 06:17

## 2018-09-05 RX ADMIN — Medication 88 MICROGRAM(S): at 06:51

## 2018-09-05 RX ADMIN — PANTOPRAZOLE SODIUM 40 MILLIGRAM(S): 20 TABLET, DELAYED RELEASE ORAL at 06:17

## 2018-09-05 RX ADMIN — Medication 100 MILLIGRAM(S): at 06:18

## 2018-09-05 RX ADMIN — Medication 150 MILLIGRAM(S): at 12:37

## 2018-09-05 RX ADMIN — LISINOPRIL 40 MILLIGRAM(S): 2.5 TABLET ORAL at 06:17

## 2018-09-05 RX ADMIN — Medication 100 MILLIGRAM(S): at 12:37

## 2018-09-05 RX ADMIN — Medication 650 MILLIGRAM(S): at 00:00

## 2018-09-05 RX ADMIN — CLOPIDOGREL BISULFATE 75 MILLIGRAM(S): 75 TABLET, FILM COATED ORAL at 12:39

## 2018-09-05 NOTE — DISCHARGE NOTE ADULT - HOSPITAL COURSE
74 yo F with HTN, HLD, hypothyroidism, and h/o bilateral carotid stenosis s/p R CEA (3/2014) and L CEA (4/2016), L ICA stent placement for recurrent L CEA re-stenosis (12/12/2017, 8x30 mm Wallstent, complicated by CVA with residual R-sided weakness)  now found to have restenosis in left carotid artery seen on office duplex (Proximal stent velocity 148, distal stent velocity 585/166) and CTA neck (moderate stenosis in the proximal and mid-stent, significant in-stent restenosis in the distal stent).   She presents today for elective left carotid angiogram with stent placement. Neuro-exam is positive for residual R-sided weakness, no paresthesias, no facial droop, no slurred speech. No fevers, no nausea, no CP/SOA. s/p  L caortid angiogram, stenting (8x60 mm EV3 Everflex stent) and angioplasty with 5x80 mustang balloon 9/4. Her postoperative course was unremarkable with advancement of diet, passing trial of void, and pain control. On day of discharge patient was stable to be d/c'd home.

## 2018-09-05 NOTE — DISCHARGE NOTE ADULT - MEDICATION SUMMARY - MEDICATIONS TO TAKE
I will START or STAY ON the medications listed below when I get home from the hospital:    aspirin 81 mg oral tablet  -- 1 tab(s) by mouth once a day. MAKE SURE YOU TAKE THIS EVERYDAY!!!  -- Indication: For Home medication    ramipril 10 mg oral capsule  -- 1 cap(s) by mouth once a day  -- Indication: For Hypertension    Lyrica 150 mg oral capsule  -- 1 cap(s) by mouth once a day  -- Indication: For Home medication    simvastatin 20 mg oral tablet  -- 1 tab(s) by mouth once a day (at bedtime)  -- Indication: For Hyperlipidemia    Plavix 75 mg oral tablet  -- 1 tab(s) by mouth once a day MDD:1  -- Do not take aspirin or aspirin containing products without knowledge and consent of your physician.    -- Indication: For Home medication    amLODIPine 5 mg oral tablet  -- 1 tab(s) by mouth once a day  -- Indication: For Hypertension    omega-3 polyunsaturated fatty acids 1000 mg oral capsule  --  by mouth   -- Indication: For Home medication    Dexilant 60 mg oral delayed release capsule  -- 1 cap(s) by mouth once a day  -- Indication: For Home medication    Synthroid 88 mcg (0.088 mg) oral tablet  -- 1 tab(s) by mouth once a day  -- Indication: For Hypothyroid

## 2018-09-05 NOTE — DISCHARGE NOTE ADULT - CARE PROVIDER_API CALL
Enrique Gloria), Vascular Surgery  [address]  [address]  [place], [place] [address]  Phone: [phone]  Fax: [phone]

## 2018-09-05 NOTE — PROGRESS NOTE ADULT - SUBJECTIVE AND OBJECTIVE BOX
24hr Events:  O/N: Passed TOV, ordered for regular diet in AM, d/cd IVF, right groin soft  9/4/18: Left carotid angio; left ICA stent,      Assessment/Plan:  72 yo F with HTN, b.l ICA stenosis now with in-stent restenosis of left carotid stent  s/p L CEA in 4/2016 and DANGELO in 12/2017    Plan:    - Dual antiplatelets with aspirin and Plavix  - DVT PPX  - DASH diet  -Home medication 24hr Events:  O/N: Passed TOV, ordered for regular diet in AM, d/cd IVF, right groin soft  9/4/18: Left carotid angio; left ICA stent,  S.  No complaints.    ceFAZolin   IVPB 1000  amLODIPine   Tablet 5  aspirin  chewable 81  ceFAZolin   IVPB 1000  clopidogrel Tablet 75  lisinopril 40      Allergies    No Known Allergies    Intolerances        Vital Signs Last 24 Hrs  T(C): 35.9 (05 Sep 2018 05:22), Max: 37.1 (04 Sep 2018 18:00)  T(F): 96.7 (05 Sep 2018 05:22), Max: 98.8 (04 Sep 2018 18:00)  HR: 56 (05 Sep 2018 04:56) (56 - 80)  BP: 111/53 (05 Sep 2018 04:56) (99/58 - 168/76)  BP(mean): 77 (05 Sep 2018 04:56) (72 - 109)  RR: 14 (05 Sep 2018 04:56) (14 - 16)  SpO2: 97% (05 Sep 2018 04:56) (97% - 100%)    Physical Exam:  General: awake, alert, NAD No neuro deficits.  Pulmonary:  Cardiovascular:  Abdominal:  Extremities: right groin soft, no hematoma.  Pulses:   Right:                                                                           Left:  FEM [ ]2+ [ ]1+ [ ] doppler                                            FEM [ ]2+ [ ]1+ [ ] doppler    POP [ ]2+ [ ]1+ [ ] doppler                                            POP [ ]2+ [ ]1+ [ ] doppler    DP [ ]2+ [ ]1+ [ ] doppler                                               DP [ ]2+ [ ]1+ [ ] doppler  PT[ ]2+ [ ]1+ [ ] doppler                                                 PT [ ]2+ [ ]1+ [ ] doppler      LABS:                        11.3   11.9  )-----------( 310      ( 05 Sep 2018 06:07 )             35.7     09-05    140  |  105  |  18  ----------------------------<  107<H>  4.4   |  24  |  0.75    Ca    9.1      05 Sep 2018 06:07  Phos  3.1     09-05  Mg     2.1     09-05      PT/INR - ( 04 Sep 2018 09:33 )   PT: 11.2 sec;   INR: 1.01          PTT - ( 04 Sep 2018 09:33 )  PTT:31.8 sec      RADIOLOGY & ADDITIONAL TESTS:      Assessment/Plan:  72 yo F with HTN, b.l ICA stenosis now with in-stent restenosis of left carotid stent  s/p L CEA in 4/2016 and DANGELO in 12/2017    Plan:    - Dual antiplatelets with aspirin and Plavix  - DVT PPX  - DASH diet  -Home medication  - d/c home

## 2018-09-05 NOTE — DISCHARGE NOTE ADULT - CARE PLAN
Principal Discharge DX:	Carotid stenosis, left  Goal:	increased blood flow and wound healing  Assessment and plan of treatment:	Follow up with Dr. Gloria in 1-2 weeks in office at [phone]   cont asa/plavix/statin  May shower Do Not bathe  wash rt groin site with peroxide and water daily

## 2018-09-05 NOTE — DISCHARGE NOTE ADULT - NS AS ACTIVITY OBS
Showering allowed/Walking-Indoors allowed/Walking-Outdoors allowed/Stairs allowed/No Heavy lifting/straining/Do not drive or operate machinery

## 2018-09-05 NOTE — DISCHARGE NOTE ADULT - PATIENT PORTAL LINK FT
You can access the [facility] Patient Portal, offered by [facility], by registering with the following website: [email or web address]

## 2018-09-05 NOTE — DISCHARGE NOTE ADULT - PLAN OF CARE
increased blood flow and wound healing Follow up with Dr. Gloria in 1-2 weeks in office at [phone]   cont asa/plavix/statin  May shower Do Not bathe  wash rt groin site with peroxide and water daily

## 2018-09-17 DIAGNOSIS — E55.9 VITAMIN D DEFICIENCY, UNSPECIFIED: ICD-10-CM

## 2018-09-17 DIAGNOSIS — Z79.82 LONG TERM (CURRENT) USE OF ASPIRIN: ICD-10-CM

## 2018-09-17 DIAGNOSIS — I10 ESSENTIAL (PRIMARY) HYPERTENSION: ICD-10-CM

## 2018-09-17 DIAGNOSIS — I69.951 HEMIPLEGIA AND HEMIPARESIS FOLLOWING UNSPECIFIED CEREBROVASCULAR DISEASE AFFECTING RIGHT DOMINANT SIDE: ICD-10-CM

## 2018-09-17 DIAGNOSIS — E03.9 HYPOTHYROIDISM, UNSPECIFIED: ICD-10-CM

## 2018-09-17 DIAGNOSIS — I65.22 OCCLUSION AND STENOSIS OF LEFT CAROTID ARTERY: ICD-10-CM

## 2018-09-17 DIAGNOSIS — E78.5 HYPERLIPIDEMIA, UNSPECIFIED: ICD-10-CM

## 2018-09-17 DIAGNOSIS — Z79.02 LONG TERM (CURRENT) USE OF ANTITHROMBOTICS/ANTIPLATELETS: ICD-10-CM

## 2018-09-26 ENCOUNTER — APPOINTMENT (OUTPATIENT)
Dept: VASCULAR SURGERY | Facility: CLINIC | Age: 73
End: 2018-09-26
Payer: MEDICARE

## 2018-09-26 VITALS — HEART RATE: 70 BPM | SYSTOLIC BLOOD PRESSURE: 135 MMHG | DIASTOLIC BLOOD PRESSURE: 77 MMHG | OXYGEN SATURATION: 98 %

## 2018-09-26 PROCEDURE — 99214 OFFICE O/P EST MOD 30 MIN: CPT

## 2018-09-26 PROCEDURE — 93880 EXTRACRANIAL BILAT STUDY: CPT

## 2018-10-25 PROCEDURE — C1769: CPT

## 2018-10-25 PROCEDURE — C1887: CPT

## 2018-10-25 PROCEDURE — 85025 COMPLETE CBC W/AUTO DIFF WBC: CPT

## 2018-10-25 PROCEDURE — 85610 PROTHROMBIN TIME: CPT

## 2018-10-25 PROCEDURE — 85027 COMPLETE CBC AUTOMATED: CPT

## 2018-10-25 PROCEDURE — C1894: CPT

## 2018-10-25 PROCEDURE — C1725: CPT

## 2018-10-25 PROCEDURE — 85730 THROMBOPLASTIN TIME PARTIAL: CPT

## 2018-10-25 PROCEDURE — 80048 BASIC METABOLIC PNL TOTAL CA: CPT

## 2018-10-25 PROCEDURE — 84100 ASSAY OF PHOSPHORUS: CPT

## 2018-10-25 PROCEDURE — 83735 ASSAY OF MAGNESIUM: CPT

## 2018-10-25 PROCEDURE — 36415 COLL VENOUS BLD VENIPUNCTURE: CPT

## 2018-10-25 PROCEDURE — C1876: CPT

## 2019-04-03 ENCOUNTER — APPOINTMENT (OUTPATIENT)
Dept: VASCULAR SURGERY | Facility: CLINIC | Age: 74
End: 2019-04-03
Payer: MEDICARE

## 2019-04-03 VITALS — OXYGEN SATURATION: 98 % | DIASTOLIC BLOOD PRESSURE: 75 MMHG | SYSTOLIC BLOOD PRESSURE: 151 MMHG | HEART RATE: 68 BPM

## 2019-04-03 VITALS
SYSTOLIC BLOOD PRESSURE: 151 MMHG | OXYGEN SATURATION: 98 % | RESPIRATION RATE: 68 BRPM | DIASTOLIC BLOOD PRESSURE: 75 MMHG

## 2019-04-03 PROCEDURE — 93880 EXTRACRANIAL BILAT STUDY: CPT

## 2019-04-03 PROCEDURE — 99214 OFFICE O/P EST MOD 30 MIN: CPT

## 2019-04-08 NOTE — ASSESSMENT
[FreeTextEntry1] : 71 yo F with HTN, HLD, hypothyroidism, and h/o bilateral carotid stenosis s/p R CEA (3/2014) and L CEA (4/2016) with two elective left carotid stents (CFA) placed (12/12/2017 and most recently on 9/4/2018), here for follow-up. US done showing her carotid stents are patent.  Given epistaxis will discontinue ASA, continue plavix. Will follow-up here in 1 year.

## 2019-04-08 NOTE — HISTORY OF PRESENT ILLNESS
[FreeTextEntry1] : 71 yo F with HTN, HLD, hypothyroidism, and h/o bilateral carotid stenosis s/p R CEA (3/2014) and L CEA (4/2016) with two elective left carotid stents (CFA) placed (12/12/2017 and most recently on 9/4/2018). She is doing. No medical complaints at this time. She is currently on plavix and aspirin. She does state that she has been bleeding from her nose intermittently.

## 2019-04-08 NOTE — PHYSICAL EXAM
[Respiratory Effort] : normal respiratory effort [Normal Heart Sounds] : normal heart sounds [2+] : left 2+ [Normal Breath Sounds] : Normal breath sounds [JVD] : no jugular venous distention  [de-identified] : Well appearing, NAD [de-identified] : NCAT [de-identified] : bilateral incisions well healed

## 2019-04-08 NOTE — ADDENDUM
[FreeTextEntry1] : Documented by Britton Stoner acting as a scribe for Dr. Enrique Gloria on 04/03/2019

## 2019-04-08 NOTE — CONSULT LETTER
[Dear  ___] : Dear  [unfilled], [FreeTextEntry2] : Travon Luna MD\par [facility] [address]\[place] [address] [FreeTextEntry1] : I saw Ms Julianna Berman in my office for her 6 month follow-up. As you may recall, she had bilateral carotid endarterectomies back in 2014 and 2016. However, her left common carotid artery restenosed and she underwent elective carotid artery stent placements in 12/12/2017 and most recently on 9/4/2018. Postoperatively, she had some right sided weakness which has been improving. She is currently on plavix and aspirin. Repots intermittent nosebleed episodes in the last few weeks, otherwise feeling well. \par \par On exam, she is neurologically intact with no motor deficit noted.\par \par Carotid duplex demonstrated the right carotid endarterectomy and the left carotid stents are all patent.\par \par Overall, I am pleased with Ms. Berman's progress. The duplex demonstrates both sides are patent. Given her epistaxis, I have recommended her to stop the aspirin and continue plavix. I will see her again in 1 year or sooner if needed. \par \par My complete EMR office note is below  [FreeTextEntry3] : Sincerely, \par \par Enrique Gloria M.D. \par , [facility]\par , Department of Surgery [facility]\par Professor of Surgery, [employer] at [facility]

## 2019-04-08 NOTE — END OF VISIT
[FreeTextEntry3] : All medical record entries made by the Scribe were at my, Dr. Gloria's direction and personally dictated by me on 04/03/2019 I have reviewed the chart and agree that the record accurately reflects my personal performance of the history, physical exam, assessment and plan. I have also personally directed, reviewed, and agreed with the chart.

## 2020-06-10 ENCOUNTER — APPOINTMENT (OUTPATIENT)
Dept: VASCULAR SURGERY | Facility: CLINIC | Age: 75
End: 2020-06-10
Payer: MEDICARE

## 2020-06-10 PROCEDURE — 99214 OFFICE O/P EST MOD 30 MIN: CPT

## 2020-06-10 PROCEDURE — 93880 EXTRACRANIAL BILAT STUDY: CPT

## 2020-06-17 NOTE — HISTORY OF PRESENT ILLNESS
[FreeTextEntry1] : 74 yo F with PMH of HTN, HLD, hypothyroidism, and h/o bilateral carotid stenosis s/p R CEA (3/2014) and L CEA (4/2016) with two elective left carotid stents (CFA) placed (12/12/2017 and most recently on 9/4/2018). She is doing. No medical complaints at this time. She is currently on plavix only (last time ASA dc'd given epitaxies episodes).\par \par Pt seen during COVID19 pandemic, PPE and precautions taken during visit. Pre-appt day, pt denies any contact with known +COVID people/family, recent hospitalization, SOB, flu-like symptoms, N/V/D, fever or chills.

## 2020-06-17 NOTE — ASSESSMENT
[FreeTextEntry1] : 74 yo F with h/o bilateral carotid stenosis s/p R CEA (3/2014) and L CEA (4/2016) with two elective left carotid stents (CFA) placed (12/12/2017 and most recently on 9/4/2018), here for follow-up. On exam, no focal neurological deficits. US done showing her R CEA is patent, and the left carotid stent too but with elevated velocities and mild hyperplastic changes. Pt to continue plavix. Will follow-up here in 3 months for an early scan.

## 2020-06-17 NOTE — PROCEDURE
[FreeTextEntry1] : Carotid US showing patent right CEA. Left carotid stent patent but elevated velocities since last study with mild hyperplastic changes

## 2020-06-17 NOTE — CONSULT LETTER
[Dear  ___] : Dear  [unfilled], [FreeTextEntry2] : Travon Luna MD\par [facility] [address]\[place] [address]  [FreeTextEntry3] : Sincerely, \par \par Enrique Gloria M.D. \par , Surgical Services [facility]\par , Department of Surgery [facility]\par Professor of Surgery, [employer] at [facility]  [FreeTextEntry1] : I saw Ms Julianna Berman in my office for her yearly follow-up. She had bilateral carotid endarterectomies back in 2014 and 2016. However, her left common carotid artery restenosed and she underwent elective carotid artery stent placements in 2017 and 2018. Since then, she has remained stable and denies any neurological symptoms. She is currently on plavix; during her last visit, we stopped the aspiring due to epitaxis episodes. \par \par On exam, she is neurologically intact with no motor deficit noted.\par \par Carotid duplex today demonstrated the right carotid endarterectomy is patent. On the left side, the stent is patent however there is noted increase in velocities with mild hyperplastic changes at the level of the internal carotid.\par \par I am pleased Ms Berman's right carotid endarterectomy remains patent. However given findings on the left carotid stent, I plan to see her in 3 months for an early scan. \par \par My complete EMR office note is below.

## 2020-06-17 NOTE — PHYSICAL EXAM
[Respiratory Effort] : normal respiratory effort [Normal Rate and Rhythm] : normal rate and rhythm [2+] : left 2+ [No Rash or Lesion] : No rash or lesion [Alert] : alert [Oriented to Person] : oriented to person [Oriented to Place] : oriented to place [Calm] : calm [Oriented to Time] : oriented to time [JVD] : no jugular venous distention  [de-identified] : Well appearing, cooperative [de-identified] : bilateral incisions well healed [de-identified] : FROM

## 2020-09-09 ENCOUNTER — APPOINTMENT (OUTPATIENT)
Dept: VASCULAR SURGERY | Facility: CLINIC | Age: 75
End: 2020-09-09
Payer: COMMERCIAL

## 2020-09-09 PROCEDURE — 99214 OFFICE O/P EST MOD 30 MIN: CPT

## 2020-09-09 PROCEDURE — 93880 EXTRACRANIAL BILAT STUDY: CPT

## 2020-09-11 NOTE — ASSESSMENT
[FreeTextEntry1] : 74 y/o F with h/o bilateral carotid stenosis s/p R CEA (3/2014) and L CEA (4/2016) with two elective left carotid stents (CFA) placed (12/12/2017 and most recently on 9/4/2018), here for follow-up. On exam, no focal neurological deficits. US done showing her R CEA is patent, and the left carotid stent too with elevated velocities but dtable since last study, and mild hyperplastic changes. Pt recommended to take baby aspirin x 2 days a week (Monday and Thursday) to avoid recurrent epistaxis and continue Plavix daily. Patient recommended increase daily food intake. This was all discussed in the presence of patients daughter. She will follow-up with us in 3 months for repeat scan.

## 2020-09-11 NOTE — CONSULT LETTER
[Dear  ___] : Dear  [unfilled], [FreeTextEntry2] : Travon Luna MD\par [facility] [address]\[place] [address]  [FreeTextEntry1] : I saw Ms Julianna Berman for an early follow-up. She is following up for a recurrent moderate stenosis of the left carotid artery stent.  It was already re-stented once.  She remains asymptomatic and on Plavix.  She has had recurrent epitaxies episodes so she stopped taking the aspirin daily and is only taking it three times per week.\par \par On exam, she is neurologically intact. No carotid bruits. \par \par Carotid duplex today demonstrated the right carotid endarterectomy is patent. The left sided stent is patent with stable velocities since last study and mild hyperplastic changes at the level of the internal carotid.\par \par Ms Berman's right carotid endarterectomy and left carotid stent remain patent. I am pleased the higher velocities on the left side have remained stable when compared to the previous study. I plan to see her in 3 months for another early scan to ensure there is no changes. Regarding the epitaxies episodes, I have asked her to continue the Plavix daily and only take the aspirin twice per week.\par \par My complete EMR office note is below.  [FreeTextEntry3] : Sincerely, \par \par Enrique Gloria M.D. \par , Surgical Services [facility]\par , Department of Surgery [facility]\par Professor of Surgery, [employer] at [facility]\par

## 2020-09-11 NOTE — PROCEDURE
[FreeTextEntry1] : Carotid US showing patent right CEA. Left carotid stent patent but elevated velocities which are stable though since her last study, mild hyperplastic changes

## 2020-09-11 NOTE — ADDENDUM
[FreeTextEntry1] : This note was written by Ada Reagan on 09/09/2020 acting as scribe for Dr. Enrique Gloria M.D.\par \par I, Dr. Enrique Gloria, have read and attest that all the information, medical decision making and discharge instructions within are true and accurate.

## 2020-09-11 NOTE — PHYSICAL EXAM
[Respiratory Effort] : normal respiratory effort [Normal Rate and Rhythm] : normal rate and rhythm [No Rash or Lesion] : No rash or lesion [2+] : left 2+ [Alert] : alert [Oriented to Person] : oriented to person [Oriented to Place] : oriented to place [Oriented to Time] : oriented to time [Calm] : calm [JVD] : no jugular venous distention  [Carotid Bruits] : no carotid bruits [Ankle Swelling (On Exam)] : not present [Abdomen Tenderness] : ~T ~M No abdominal tenderness [de-identified] : Well appearing, cooperative, pleasant [de-identified] : NCAT [de-identified] : Bilateral incisions well healed [de-identified] : FROM +

## 2020-09-11 NOTE — HISTORY OF PRESENT ILLNESS
[FreeTextEntry1] : 74 y/o F with PMH of HTN, HLD, hypothyroidism, and h/o bilateral carotid stenosis s/p R CEA (3/2014) and L CEA (4/2016) with two elective left carotid stents (CFA) placed (12/12/2017 and most recently on 9/4/2018). Patient feels well overall she has been compliant Plavix. However, she reports she has been having nosebleed, last ones were yesterday and earlier today. She stopped taking baby aspirin daily about a week ago and has been taking baby aspiring x 3 times a week. She has been cautious of what she is eating as she is concerned her LCA stent might narrow depending on the type of food she is eating. \par \par Denies: TIA/CVA symptoms. \par \par She is accompanied by her daughter.

## 2020-12-09 ENCOUNTER — APPOINTMENT (OUTPATIENT)
Dept: VASCULAR SURGERY | Facility: CLINIC | Age: 75
End: 2020-12-09
Payer: COMMERCIAL

## 2020-12-09 VITALS — DIASTOLIC BLOOD PRESSURE: 71 MMHG | HEART RATE: 65 BPM | SYSTOLIC BLOOD PRESSURE: 110 MMHG

## 2020-12-09 VITALS — DIASTOLIC BLOOD PRESSURE: 71 MMHG | SYSTOLIC BLOOD PRESSURE: 116 MMHG | HEART RATE: 83 BPM

## 2020-12-09 PROCEDURE — 99072 ADDL SUPL MATRL&STAF TM PHE: CPT

## 2020-12-09 PROCEDURE — 99214 OFFICE O/P EST MOD 30 MIN: CPT

## 2020-12-09 PROCEDURE — 93880 EXTRACRANIAL BILAT STUDY: CPT

## 2020-12-24 NOTE — ADDENDUM
[FreeTextEntry1] : This note was written by Ada Reagan on 12/09/2020 acting as scribe for Enrique Schilling M.D.\par \par I, Enrique Gray  have read and attest that all the information, medical decision making and discharge instructions within are true and accurate.

## 2020-12-24 NOTE — HISTORY OF PRESENT ILLNESS
[FreeTextEntry1] : 74 y/o F with PMH of HTN, HLD, hypothyroidism, and h/o bilateral carotid stenosis s/p R CEA (3/2014) and L CEA (4/2016) with two elective left carotid stents (CFA) placed (12/12/2017 and most recently on 9/4/2018). She presents for early 3 mo f/u. Patient feels well; she has been compliant Plavix. She has been taking baby aspirin x 2 a week. Denies any new nosebleeds. She has been staying active around the house and has been increasing her PO intake. Denies any neurological symtoms. \par \par Denies: TIA/CVA symptoms. \par \par

## 2020-12-24 NOTE — ASSESSMENT
[FreeTextEntry1] : 76 y/o F with h/o bilateral carotid stenosis s/p R CEA (3/2014) and L CEA (4/2016) with two elective left carotid stents (CFA) placed (12/12/2017 and most recently on 9/4/2018), here for follow-up. \par On exam, no focal neurological deficits. US done showing her R CEA is patent, and the left carotid stent patent as well with elevated velocities but have remained stable since last study. No vascular surgery interventions for now.  \par \par Plan: \par - Continue baby aspirin twice/week ( Monday and Thursday) \par - Plavix daily\par - Increase daily food intake\par - F/u with us here in 3 months\par - Patient to contact the office in case she develops any concerning symptoms.

## 2020-12-24 NOTE — PROCEDURE
[FreeTextEntry1] : B/L Carotid US: \par RCA: CEA Patent.\par LCA: Stent patent PSV increase suggestive of hyperplastic changes. \par No significant changes since last study. \par \par

## 2020-12-24 NOTE — PHYSICAL EXAM
[Respiratory Effort] : normal respiratory effort [Normal Rate and Rhythm] : normal rate and rhythm [2+] : left 2+ [No Rash or Lesion] : No rash or lesion [Alert] : alert [Oriented to Person] : oriented to person [Oriented to Place] : oriented to place [Oriented to Time] : oriented to time [Calm] : calm [JVD] : no jugular venous distention  [Carotid Bruits] : no carotid bruits [Right Carotid Bruit] : no bruit heard over the right carotid [Left Carotid Bruit] : no bruit heard over the left carotid [Ankle Swelling (On Exam)] : not present [Abdomen Tenderness] : ~T ~M No abdominal tenderness [de-identified] : Well appearing, cooperative, pleasant [de-identified] : NCAT [de-identified] : Bilateral incisions well healed [de-identified] : FROM 5/5 x 4.

## 2021-01-05 NOTE — PATIENT PROFILE ADULT. - PAIN SCALE PREFERRED, PROFILE
----- Message from Kj Khalil MD sent at 1/5/2021  3:16 PM EST -----  Please call the patient regarding her abnormal result  Urine culture is mixed  Is she  feeling better? If not she should probably have an appointment to be seen  numerical 0-10

## 2021-03-17 ENCOUNTER — APPOINTMENT (OUTPATIENT)
Dept: VASCULAR SURGERY | Facility: CLINIC | Age: 76
End: 2021-03-17
Payer: COMMERCIAL

## 2021-03-17 VITALS — SYSTOLIC BLOOD PRESSURE: 122 MMHG | HEART RATE: 69 BPM | DIASTOLIC BLOOD PRESSURE: 80 MMHG

## 2021-03-17 PROCEDURE — 93880 EXTRACRANIAL BILAT STUDY: CPT

## 2021-03-17 PROCEDURE — 99214 OFFICE O/P EST MOD 30 MIN: CPT

## 2021-03-17 PROCEDURE — 99072 ADDL SUPL MATRL&STAF TM PHE: CPT

## 2021-03-22 NOTE — PROCEDURE
[FreeTextEntry1] : B/L Carotid US: RCA: CEA Patent. LCA: Stent patent PSV increase suggestive of hyperplastic changes. \par No significant changes since last study

## 2021-03-22 NOTE — PHYSICAL EXAM
[Respiratory Effort] : normal respiratory effort [Normal Rate and Rhythm] : normal rate and rhythm [2+] : left 2+ [No Rash or Lesion] : No rash or lesion [Alert] : alert [Oriented to Person] : oriented to person [Oriented to Place] : oriented to place [Oriented to Time] : oriented to time [Calm] : calm [JVD] : no jugular venous distention  [Carotid Bruits] : no carotid bruits [Right Carotid Bruit] : no bruit heard over the right carotid [Left Carotid Bruit] : no bruit heard over the left carotid [Ankle Swelling (On Exam)] : not present [Abdomen Tenderness] : ~T ~M No abdominal tenderness [de-identified] : Well appearing, cooperative, pleasant, ambulates with a cane.  [de-identified] : NCAT [de-identified] : Bilateral incisions well healed [de-identified] : FROM 5/5 x 4.

## 2021-03-22 NOTE — ADDENDUM
[FreeTextEntry1] : This note was written by Ada Reagan on 03/17/2021 acting as scribe for Juani Simmons M.D.\par \par I, Dr. Enrique Gloria  have read and attest that all the information, medical decision making and discharge instructions within are true and accurate.

## 2021-03-22 NOTE — ASSESSMENT
[FreeTextEntry1] : 74 y/o F with h/o bilateral carotid stenosis s/p R CEA (3/2014) and L CEA (4/2016) with two elective left carotid stents (CFA) placed (12/12/2017 and most recently on 9/4/2018), here for follow-up. On exam, no focal neurological deficits. US done showing her R CEA is patent, and the left carotid stent patent as well with elevated velocities but have remained stable since last study. No vascular surgery interventions recommended for now. Will continue to monitor with repeat US in 3 months.   \par \par

## 2021-03-22 NOTE — CONSULT LETTER
[Dear  ___] : Dear  [unfilled], [FreeTextEntry2] : Travon Luna MD\par [facility] [address]\[place] [address]  [FreeTextEntry1] : I saw Ms Julianna Berman for another early follow-up. She has recurrent, moderate and asymptomatic stenosis of the left carotid artery stent. It was previously re-stented once.  Today she reports feeling well and denies any neurological symptoms. She is on Plavix daily and baby aspirin only twice a week given episodes of recurrent epitaxies which have resolved. She tries to stay active as much as possible. \par \par On exam, no focal neurological deficits or carotid bruits. Blood pressure 122/80, heart rate 69 b/min.\par \par Carotid duplex today demonstrated the right carotid endarterectomy remains patent. The left sided stent is patent with stable velocities since last study.\par \par I am pleased Ms Berman's right carotid endarterectomy and left carotid stent remain patent. The higher velocities on the left side have not changed from previous study. I plan to see her again in 3 months for another early scan to ensure there is no changes. \par \par My complete EMR office note is below.  [FreeTextEntry3] : Sincerely, \par \par Enrique Gloria M.D. \par , Surgical Services [facility]\par , Department of Surgery [facility]\par Professor of Surgery, [employer] at [facility]

## 2021-03-22 NOTE — HISTORY OF PRESENT ILLNESS
[FreeTextEntry1] : 74 y/o F with PMH of HTN, HLD, hypothyroidism, and h/o bilateral carotid stenosis s/p R CEA (3/2014) and L CEA (4/2016) with two elective left carotid stents (CFA) placed (12/12/2017 and most recently on 9/4/2018). She presents for routine follow up . Patient feels well; she has been compliant Plavix. She has been taking baby aspirin x 2 a week. Denies any new nosebleeds. She has been staying active around the house. Denies any dizziness, lightheadedness,CVA/ TIA, vision change, or other neurologic symptoms.  \par \par She is accompanied by a family member today.

## 2021-06-02 ENCOUNTER — APPOINTMENT (OUTPATIENT)
Dept: VASCULAR SURGERY | Facility: CLINIC | Age: 76
End: 2021-06-02
Payer: COMMERCIAL

## 2021-06-02 VITALS — HEART RATE: 61 BPM | DIASTOLIC BLOOD PRESSURE: 76 MMHG | SYSTOLIC BLOOD PRESSURE: 145 MMHG

## 2021-06-02 PROCEDURE — 93880 EXTRACRANIAL BILAT STUDY: CPT

## 2021-06-02 PROCEDURE — 99213 OFFICE O/P EST LOW 20 MIN: CPT

## 2021-06-07 NOTE — HISTORY OF PRESENT ILLNESS
[FreeTextEntry1] : 75 y/o F with PMH of HTN, HLD, hypothyroidism, and h/o bilateral carotid stenosis s/p R CEA (3/2014) and L CEA (4/2016) with two elective left carotid stents (CFA) placed (12/12/2017 and most recently on 9/4/2018). She presents for routine follow up. Patient feels well overall. She has been compliant with Plavix, baby aspirin, and BP meds. She has been staying active around the house. Denies any dizziness, lightheadedness,CVA/ TIA, vision change, or other neurologic symptoms.  \par \par She is accompanied by a family member today.

## 2021-06-07 NOTE — ASSESSMENT
[FreeTextEntry1] : 75 y/o F with h/o bilateral carotid stenosis s/p R CEA (3/2014) and L CEA (4/2016) with two elective left carotid stents (CFA) placed (12/12/2017 and most recently on 9/4/2018), here for follow-up. On exam, BP: 145/76 mmHg, HR: 61 bpm. No focal neurological deficits. US done showing her R CEA is patent, and the left carotid stent patent as well with elevated velocities but have remained stable since last study. No vascular surgery interventions recommended for now. Will continue to monitor with repeat US in 6 months.   \par \par

## 2021-06-07 NOTE — PHYSICAL EXAM
[Respiratory Effort] : normal respiratory effort [Normal Rate and Rhythm] : normal rate and rhythm [2+] : left 2+ [No Rash or Lesion] : No rash or lesion [Alert] : alert [Oriented to Person] : oriented to person [Oriented to Place] : oriented to place [Oriented to Time] : oriented to time [Calm] : calm [JVD] : no jugular venous distention  [Carotid Bruits] : no carotid bruits [Right Carotid Bruit] : no bruit heard over the right carotid [Left Carotid Bruit] : no bruit heard over the left carotid [Ankle Swelling (On Exam)] : not present [Abdomen Tenderness] : ~T ~M No abdominal tenderness [de-identified] : Well appearing, cooperative, pleasant, ambulates with a cane.  [de-identified] : NCAT [de-identified] : Bilateral incisions nicely healed [de-identified] : FROM

## 2021-06-07 NOTE — CONSULT LETTER
[Dear  ___] : Dear  [unfilled], [FreeTextEntry2] : Travon Luna MD\par [facility] [address]\[place] [address]  [FreeTextEntry3] : Sincerely, \par \par Enrique Gloria M.D. \par , Surgical Services [facility]\par , Department of Surgery [facility]\par Professor of Surgery, [employer] at [facility]  [FreeTextEntry1] : I saw Ms Julianna Berman for an early follow-up. She has recurrent, moderate and asymptomatic stenosis of the left carotid artery stent. It was previously re-stented once. The right carotid endarterectomy remains patent. Today, she reports feeling well. She continues to take Plavix daily and baby aspirin (twice a week). Denies any recurrent episodes of epitaxies or neurological symptoms. \par \par On exam, she is neurologically intact. No carotid bruits. Blood pressure 145/76, heart rate 61 b/min.\par \par Carotid duplex showed the left sided stent is patent with mild changes but not significant from previous study. The right carotid endarterectomy remains patent. \par \par Ms Berman's left carotid stent remains patent and the velocities have not changed from previous study. The right carotid endarterectomy is patent. Given she has been stable over the last few visits, I plan to see her now in 6 months. \par \par My complete EMR office note is below. \par

## 2021-06-07 NOTE — PROCEDURE
[FreeTextEntry1] : Carotid US to evaluate CEA and stents bilaterally show: \par R ICA: Patent CEA. No evidence of significant plaque noted. \par L ICA: stent patent carotid artery stent with no evidence of in stent stenosis. Mild increase in PSV  through stent with mild hyperplastic changes. No significant change when compared to previous study

## 2021-06-07 NOTE — ADDENDUM
[FreeTextEntry1] : This note was written by Ada Reagan on 06/02/2021 acting as scribe for Juani Simmons M.D.\par \par  I, Dr. Enrique Gloria, personally performed the evaluation and management (E/M) services for this established patient who presents today with (a) chronic problem(s) of (an) existing condition(s).  That E/M includes conducting the examination, assessing all conditions, and establishing a plan of care. Today, my ACP, Ora Thomas NP, was here to observe my evaluation and management services for this condition(s) to be followed going forward.

## 2021-06-22 NOTE — OCCUPATIONAL THERAPY INITIAL EVALUATION ADULT - ASR WT BEARING STATUS EVAL
Inter #[id number], #[id number]  Verified patient's name and date of birth.  Patient is notified regarding test results.    Per Dr Mahmood:  Fasting glucose liver kidney and thyroid within range  No urinary infection  No anemia   However iron is low  Recommend ferrous sulfate 325mg TID  Can cause constipation and darken stools   Please order stool occult test dx low iron  Repeat CBC iron ferritin in 3 months dx low iron     Patient verbalized understanding of information given.  Patient was in agreement and denied further questions or concerns.  Labs and med have been ordered.  Electronically Signed By: Tracy Fregoso CMA      
no weight-bearing restrictions

## 2021-12-08 ENCOUNTER — APPOINTMENT (OUTPATIENT)
Dept: VASCULAR SURGERY | Facility: CLINIC | Age: 76
End: 2021-12-08
Payer: COMMERCIAL

## 2021-12-08 VITALS
HEART RATE: 66 BPM | WEIGHT: 124 LBS | BODY MASS INDEX: 22.82 KG/M2 | HEIGHT: 62 IN | SYSTOLIC BLOOD PRESSURE: 108 MMHG | DIASTOLIC BLOOD PRESSURE: 64 MMHG

## 2021-12-08 PROCEDURE — 99213 OFFICE O/P EST LOW 20 MIN: CPT

## 2021-12-08 PROCEDURE — 93880 EXTRACRANIAL BILAT STUDY: CPT

## 2021-12-14 NOTE — CONSULT LETTER
[Dear  ___] : Dear  [unfilled], [FreeTextEntry2] : Travon Luna MD\par [facility] [address]\[place] [address] [FreeTextEntry1] : I saw Ms Julianna Berman for routine follow-up. She has recurrent, moderate and asymptomatic stenosis of the left carotid artery stent. It was previously re-stented once. The right carotid endarterectomy remains patent. Today, she reports feeling well and has been staying active. She continues to take Plavix daily and baby aspirin . Denies any neurological symptoms. \par \par On exam, no focal neurological deficits or carotid bruits. Blood pressure 108/64, heart rate 66 b/min.\par \par Carotid duplex showed the left sided stent is patent and as well as the right carotid endarterectomy. \par \par I am pleased Ms Berman remains stable and asymptomatic. I encouraged her to stay active and continue her medications. I will see her in 6 months for a repeat scan. \par \par My complete EMR office note is below. \par  [FreeTextEntry3] : Sincerely, \par \par Enrique Gloria M.D. \par , Surgical Services [facility]\par , Department of Surgery [facility]\par Professor of Surgery, [employer] at [facility]

## 2021-12-14 NOTE — PHYSICAL EXAM
[Respiratory Effort] : normal respiratory effort [Normal Rate and Rhythm] : normal rate and rhythm [2+] : left 2+ [No Rash or Lesion] : No rash or lesion [Alert] : alert [Oriented to Person] : oriented to person [Oriented to Place] : oriented to place [Oriented to Time] : oriented to time [Calm] : calm [JVD] : no jugular venous distention  [Carotid Bruits] : no carotid bruits [Right Carotid Bruit] : no bruit heard over the right carotid [Left Carotid Bruit] : no bruit heard over the left carotid [Ankle Swelling (On Exam)] : not present [Abdomen Tenderness] : ~T ~M No abdominal tenderness [de-identified] : Well appearing, pleasant, ambulates with a cane.  [de-identified] : NCAT [de-identified] : Bilateral incisions nicely healed [de-identified] : FROM

## 2021-12-14 NOTE — HISTORY OF PRESENT ILLNESS
[FreeTextEntry1] : 77 y/o F with PMH of HTN, HLD, hypothyroidism, and h/o bilateral carotid stenosis s/p R CEA (3/2014) and L CEA (4/2016) with two elective left carotid stents (CFA) placed (12/12/2017 and most recently on 9/4/2018). She presents for routine follow up. Patient feels well overall. She has been compliant with Plavix, baby aspirin, and BP meds. She has been staying active around the house. Denies any dizziness, lightheadedness,CVA/ TIA, vision change, or other neurologic symptoms.  \par \par She is accompanied by a family member today.

## 2021-12-14 NOTE — ADDENDUM
[FreeTextEntry1] : This note was written by Kae Calvo on 12/08/2021 acting as scribe for Juani Simmons M.D.\par \par  I, Dr. Enrique Gloria, personally performed the evaluation and management (E/M) services for this established patient who presents today with (a) chronic problem(s) of (an) existing condition(s).  That E/M includes conducting the examination, assessing all conditions, and establishing a plan of care. Today, my ACP, Ora Thomas NP, was here to observe my evaluation and management services for this condition(s) to be followed going forward.

## 2021-12-14 NOTE — ASSESSMENT
[Arterial/Venous Disease] : arterial/venous disease [Medication Management] : medication management [FreeTextEntry1] : 75 y/o F with h/o bilateral carotid stenosis s/p R CEA (3/2014) and L CEA (4/2016) with two elective left carotid stents (CFA) placed (12/12/2017 and most recently on 9/4/2018), here for follow-up. On exam, no neurological deficits. She has gained some weight since last visit. BP: 108/64 HR:66 bpm. US done shows R CEA is patent, and the left carotid stent patent as well with elevated velocities but have remained stable since last study. No vascular surgery interventions recommended. She should continue taking her aspirin/Plavix and statin. Will continue to monitor with repeat US in 6 months.  \par

## 2021-12-14 NOTE — PROCEDURE
[FreeTextEntry1] : Carotid US to evaluate CEA and stents bilaterally show: \par R ICA: Patent CEA. \par L ICA: stent patent carotid artery stent with no evidence of in stent stenosis. Mild increase in PSV  through stent with mild hyperplastic changes. No significant change when compared to previous study

## 2022-04-27 NOTE — PHYSICAL THERAPY INITIAL EVALUATION ADULT - LEVEL OF INDEPENDENCE: STAND/SIT, REHAB EVAL
Staging Info: By selecting yes to the question above you will include information on AJCC 8 tumor staging in your Mohs note. Information on tumor staging will be automatically added for SCCs on the head and neck. AJCC 8 includes tumor size, tumor depth, perineural involvement and bone invasion. contact guard

## 2022-06-08 ENCOUNTER — NON-APPOINTMENT (OUTPATIENT)
Age: 77
End: 2022-06-08

## 2022-06-10 NOTE — BRIEF OPERATIVE NOTE - PROCEDURE POST
Called patient Left VM.  
Needs Scheduling      Requested appt date:  Authorizing: Valentin Montano MD in [facility]           Priority: Routine               Diagnosis: Colon cancer screening [Z12.11]         
Patient called x2 and VM left to return call.  
<<-----Click on this checkbox to enter Post-Op Dx

## 2022-06-15 ENCOUNTER — APPOINTMENT (OUTPATIENT)
Dept: VASCULAR SURGERY | Facility: CLINIC | Age: 77
End: 2022-06-15
Payer: MEDICARE

## 2022-06-15 ENCOUNTER — NON-APPOINTMENT (OUTPATIENT)
Age: 77
End: 2022-06-15

## 2022-06-15 ENCOUNTER — APPOINTMENT (OUTPATIENT)
Dept: VASCULAR SURGERY | Facility: CLINIC | Age: 77
End: 2022-06-15

## 2022-06-15 VITALS
WEIGHT: 124 LBS | HEART RATE: 69 BPM | SYSTOLIC BLOOD PRESSURE: 110 MMHG | BODY MASS INDEX: 22.82 KG/M2 | HEIGHT: 62 IN | DIASTOLIC BLOOD PRESSURE: 77 MMHG

## 2022-06-15 PROCEDURE — 93880 EXTRACRANIAL BILAT STUDY: CPT

## 2022-06-15 PROCEDURE — 99214 OFFICE O/P EST MOD 30 MIN: CPT

## 2022-06-20 NOTE — PHYSICAL EXAM
[Ankle Swelling (On Exam)] : not present [de-identified] : Well appearing, pleasant, ambulates with a cane.  [de-identified] : FROM

## 2022-06-20 NOTE — PROCEDURE
[FreeTextEntry1] : Carotid US ordered to evaluate CEA and stents bilaterally show: \par R ICA: Patent CEA. \par L ICA: stent patent carotid artery stent with no evidence of in stent stenosis. Mild increase in PSV  through stent with mild hyperplastic changes. No significant change when compared to previous study

## 2022-06-20 NOTE — ASSESSMENT
[FreeTextEntry1] : 76 y/o F with h/o bilateral carotid stenosis s/p R CEA (3/2014) and L CEA (4/2016) with two elective left carotid stents (CFA) placed (12/12/2017 and most recently on 9/4/2018), here for follow-up. On exam, no neurological deficits. US done shows R CEA is patent, and the left carotid stent patent as well with elevated velocities but have remained stable since last study. No vascular surgery interventions recommended. She should continue taking her aspirin/Plavix and statin. She may stop the Plavix before the colonoscopy. She in unsure about the ASA having to be stopped for the procedure. Pt advised to ask the GI or PCP doctor, ideally, she would not stop the aspirin. Will continue to monitor with repeat US in 6 months. She is advised to call the office with any questions or concerns.

## 2022-12-14 ENCOUNTER — APPOINTMENT (OUTPATIENT)
Dept: VASCULAR SURGERY | Facility: CLINIC | Age: 77
End: 2022-12-14

## 2022-12-14 PROCEDURE — 99213 OFFICE O/P EST LOW 20 MIN: CPT

## 2022-12-14 PROCEDURE — 93880 EXTRACRANIAL BILAT STUDY: CPT

## 2022-12-15 NOTE — ADDENDUM
[FreeTextEntry1] : I, Dr. Enrique Gloria, personally performed the evaluation and management (E/M) services for this established patient who presents today with (a) new problem(s)/exacerbation of (an) existing condition(s).  That E/M includes conducting the examination, assessing all new/exacerbated conditions, and establishing a new plan of care.  Today, my ACP, Ora Thomas NP, was here to observe my evaluation and management services for this new problem/exacerbated condition to be followed going forward. \par \par \par The documentation for this encounter was entered by Erik Baker acting as a scribe for Dr.Gary Gloria.\par

## 2022-12-15 NOTE — ASSESSMENT
[Arterial/Venous Disease] : arterial/venous disease [Medication Management] : medication management [FreeTextEntry1] : 78 y/o F with h/o bilateral carotid stenosis s/p R CEA (3/2014) and L CEA (4/2016) with two elective left carotid stents (CFA) placed (12/12/2017 and most recently on 9/4/2018), here for follow-up. On exam, no neurological deficits. US done shows R CEA is patent, and the left carotid stent patent as well with elevated velocities but have remained stable since last study. No vascular surgery interventions recommended. She should continue taking her aspirin/Plavix and statin.  Will continue to monitor with repeat US in 6 months. She is advised to call the office with any questions or concerns.

## 2022-12-15 NOTE — HISTORY OF PRESENT ILLNESS
[FreeTextEntry1] : 76 y/o F with PMH of HTN, HLD, hypothyroidism, and h/o bilateral carotid stenosis s/p R CEA (3/2014) and L CEA (4/2016) with two elective left carotid stents (CFA) placed (12/12/2017 and most recently on 9/4/2018). She presents for routine follow up. Patient feels well overall. She has been compliant with Plavix, baby aspirin, and BP meds. She has been staying active around the house. Denies any dizziness, lightheadedness,CVA/ TIA, vision change, or other neurologic symptoms. Patient was borderline T2DM, and has been eating healthier, taking D3 and omega vitamins. \par

## 2022-12-15 NOTE — PHYSICAL EXAM
[Respiratory Effort] : normal respiratory effort [Normal Rate and Rhythm] : normal rate and rhythm [2+] : left 2+ [No Rash or Lesion] : No rash or lesion [Alert] : alert [Oriented to Person] : oriented to person [Oriented to Place] : oriented to place [Oriented to Time] : oriented to time [Calm] : calm [Ankle Swelling (On Exam)] : not present [de-identified] : Well appearing, pleasant, ambulates with a cane.  [de-identified] : FROM

## 2023-02-28 NOTE — DISCHARGE NOTE ADULT - NS AS DC STROKE ED MATERIALS
Is the patient currently in the state of [place]? YES    Visit mode:VIDEO    If the visit is dropped, the patient can be reconnected by: VIDEO VISIT: Text to cell phone: [phone]    Will anyone else be joining the visit? NO      How would you like to obtain your AVS? MyChart    Are changes needed to the allergy or medication list? NO    Reason for visit: Routine f/u          Need for Followup After Discharge/Call 911 for Stroke/Risk Factors for Stroke/Prescribed Medications/Stroke Education Booklet/Stroke Warning Signs and Symptoms

## 2023-06-07 ENCOUNTER — APPOINTMENT (OUTPATIENT)
Dept: VASCULAR SURGERY | Facility: CLINIC | Age: 78
End: 2023-06-07
Payer: MEDICARE

## 2023-06-07 VITALS — SYSTOLIC BLOOD PRESSURE: 131 MMHG | HEART RATE: 61 BPM | DIASTOLIC BLOOD PRESSURE: 78 MMHG

## 2023-06-07 DIAGNOSIS — I65.23 OCCLUSION AND STENOSIS OF BILATERAL CAROTID ARTERIES: ICD-10-CM

## 2023-06-07 PROCEDURE — 93880 EXTRACRANIAL BILAT STUDY: CPT

## 2023-06-07 PROCEDURE — 99213 OFFICE O/P EST LOW 20 MIN: CPT

## 2023-06-19 NOTE — PROCEDURE
[FreeTextEntry1] : Carotid US ordered to evaluate CEA and stents bilaterally show: \par R ICA: Patent CEA. \par L ICA: stent patent carotid artery stent with no evidence of in stent stenosis. PSV slight elevated through stent with mild hyperplastic changes however no significant changes when compared to previous study

## 2023-06-19 NOTE — CONSULT LETTER
[Dear  ___] : Dear  [unfilled], [FreeTextEntry2] : Travon Luna MD\par [facility] [address]\[place] [address] [FreeTextEntry1] : I saw Ms Julianna Berman for follow-up. She has recurrent, moderate and asymptomatic stenosis of the left carotid artery stent. It was previously re-stented once. The right carotid endarterectomy remains patent. Today, she reports feeling well and denies any neurological symptoms. She continues to take Plavix daily and statin. \par \par On exam, no focal neurological deficits or carotid bruits. Blood pressure 131/78, heart rate 61 b/min.\par \par Carotid duplex showed the left sided stent remains patent as well as the right carotid endarterectomy. \par \par Ms Berman remains stable and asymptomatic. I encouraged her to stay active and continue her medications. I will see her in 6 months for a repeat scan. \par \par My complete EMR office note is below.  [FreeTextEntry3] : Sincerely, \par \par Enrique Gloria M.D. \par , Surgical Services [facility]\par Surgeon-in-Chief, [facility]\par Professor of Surgery, [employer] at [facility]

## 2023-06-19 NOTE — HISTORY OF PRESENT ILLNESS
[FreeTextEntry1] : 77 y/o F with PMH of HTN, HLD, hypothyroidism, and h/o bilateral carotid stenosis s/p R CEA (3/2014) and L CEA (4/2016) with two elective left carotid stents (CFA) placed (12/12/2017 and most recently on 9/4/2018). She presents for routine follow up. Patient feels well overall. She has been compliant with Plavix, statin and BP meds. She has been staying active around the house. Denies any dizziness, lightheadedness,CVA/ TIA, vision change, or other neurologic symptoms. Patient was borderline T2DM, and has been eating healthier, taking D3 and omega vitamins. \par

## 2023-06-19 NOTE — PHYSICAL EXAM
[Respiratory Effort] : normal respiratory effort [Normal Rate and Rhythm] : normal rate and rhythm [2+] : left 2+ [No Rash or Lesion] : No rash or lesion [Alert] : alert [Oriented to Person] : oriented to person [Oriented to Place] : oriented to place [Oriented to Time] : oriented to time [Calm] : calm [Ankle Swelling (On Exam)] : not present [de-identified] : Well appearing, pleasant, ambulates with a cane.  [de-identified] : FROM

## 2023-06-19 NOTE — ASSESSMENT
[Arterial/Venous Disease] : arterial/venous disease [Medication Management] : medication management [FreeTextEntry1] : 77 y/o F with h/o bilateral carotid stenosis s/p R CEA (3/2014) and L CEA (4/2016) with two elective left carotid stents (CFA) placed (12/12/2017 and most recently on 9/4/2018), here for follow-up. \par On exam, no neurological deficits. US done shows R CEA is patent, and the left carotid stent patent as well with elevated velocities but have remained stable since last study. No vascular surgery interventions recommended. She should continue taking her aspirin/Plavix and statin.  Will continue to monitor with repeat US in 6 months. She is advised to call the office with any questions or concerns.

## 2023-08-30 NOTE — PHYSICAL THERAPY INITIAL EVALUATION ADULT - CRITERIA FOR SKILLED THERAPEUTIC INTERVENTIONS
anticipated equipment needs at discharge/impairments found/functional limitations in following categories/rehab potential
show

## 2023-12-06 ENCOUNTER — APPOINTMENT (OUTPATIENT)
Dept: VASCULAR SURGERY | Facility: CLINIC | Age: 78
End: 2023-12-06
Payer: MEDICARE

## 2023-12-06 VITALS
WEIGHT: 124 LBS | BODY MASS INDEX: 22.82 KG/M2 | SYSTOLIC BLOOD PRESSURE: 137 MMHG | HEART RATE: 68 BPM | DIASTOLIC BLOOD PRESSURE: 68 MMHG | HEIGHT: 62 IN

## 2023-12-06 DIAGNOSIS — Z98.890 OTHER SPECIFIED POSTPROCEDURAL STATES: ICD-10-CM

## 2023-12-06 DIAGNOSIS — Z95.828 OTHER SPECIFIED POSTPROCEDURAL STATES: ICD-10-CM

## 2023-12-06 PROCEDURE — 99213 OFFICE O/P EST LOW 20 MIN: CPT | Mod: 25

## 2023-12-06 PROCEDURE — 93880 EXTRACRANIAL BILAT STUDY: CPT

## 2024-01-05 NOTE — HISTORY OF PRESENT ILLNESS
[FreeTextEntry1] : 77 y/o F with PMH of HTN, HLD, hypothyroidism, and h/o bilateral carotid stenosis s/p R CEA (3/2014) and L CEA (4/2016) with two elective left carotid stents (CFA) placed (12/12/2017 and most recently on 9/4/2018). She presents for routine follow up. Patient feels well overall. She has been compliant with Plavix, statin and BP meds. She has been staying active around the house. Denies any dizziness, lightheadedness, CVA/TIA, vision change, or other neurologic symptoms. Patient was borderline T2DM, and has been eating healthier, taking D3 and omega vitamins.

## 2024-01-05 NOTE — PROCEDURE
[FreeTextEntry1] : Carotid US ordered to evaluate CEA and stents bilaterally shows:  R ICA: Patent CEA L ICA: Stent patent. No changes in flow.

## 2024-01-05 NOTE — ASSESSMENT
[FreeTextEntry1] : 77 y/o F with h/o bilateral carotid stenosis s/p R CEA (3/2014) and L CEA (4/2016) with two elective left carotid stents (CFA) placed (12/12/2017 and most recently on 9/4/2018), here for follow-up.  On exam, no neurological deficits. /68, HR 68 Carotid US done shows R CEA is patent, and the left carotid stent patent, velocities stable since last study.  No vascular surgery interventions recommended. She should continue taking her aspirin/Plavix and statin. Will continue to monitor with repeat US in 1 yr. She is advised to call the office with any questions or concerns.

## 2024-01-05 NOTE — ADDENDUM
[FreeTextEntry1] : I, Dr. Enrique Gloria, personally performed the evaluation and management (E/M) services for this established patient who presents today with (a) new problem(s)/exacerbation of (an) existing condition(s).  That E/M includes conducting the examination, assessing all new/exacerbated conditions, and establishing a new plan of care.  Today, my ACP, Ora Thomas NP, was here to observe my evaluation and management services for this new problem/exacerbated condition to be followed going forward.   The documentation for this encounter was entered by Jie Lawrence acting as a scribe for Dr. Enrique Gloria.

## 2024-01-05 NOTE — PHYSICAL EXAM
[de-identified] : Well appearing, pleasant, ambulates with a cane [FreeTextEntry1] : No focal neuro deficits. [de-identified] : FROM

## 2024-01-05 NOTE — CONSULT LETTER
[FreeTextEntry2] : Travon Luna  [address], [address] [place] [id number] [FreeTextEntry1] : I saw Ms Julianna Berman for follow-up. As you may recall, she has bilateral carotid stenoses and underwent bilateral carotid endarterectomies in 2014 for the right side and 2016 for the left side. On the left side, she had a stent placed in 2017 for recurrent stenosis and a second angioplasty in 2018. She has remained stable since then.  Today, she reports feeling well and denies any neurological symptoms. She continues to take Plavix daily and statin.  On exam, no focal neurological deficits. Blood pressure 137/68, heart rate 68 b/min.  Carotid duplex showed stable findings from last study. The left sided stent remains patent as well as the right carotid endarterectomy.  I am pleased with Ms Berman's status. She remains stable and asymptomatic. I encouraged her to stay active and continue her medications. I will see her in 1 year.  My complete EMR office note is below. [FreeTextEntry3] : Sincerely,   Enrique Gloria M.D.  , Surgical Services [facility] Surgeon-in-Chief, [facility], [facility] Professor of Surgery, [employer] at [facility]

## 2024-05-31 NOTE — DISCHARGE NOTE ADULT - PATIENT PORTAL LINK FT
“You can access the [facility] Patient Portal, offered by [facility], by registering with the following website: [email or web address]”
18

## 2024-12-04 ENCOUNTER — APPOINTMENT (OUTPATIENT)
Dept: VASCULAR SURGERY | Facility: CLINIC | Age: 79
End: 2024-12-04
Payer: MEDICARE

## 2024-12-04 VITALS — SYSTOLIC BLOOD PRESSURE: 147 MMHG | DIASTOLIC BLOOD PRESSURE: 72 MMHG

## 2024-12-04 DIAGNOSIS — Z98.890 OTHER SPECIFIED POSTPROCEDURAL STATES: ICD-10-CM

## 2024-12-04 DIAGNOSIS — Z95.828 OTHER SPECIFIED POSTPROCEDURAL STATES: ICD-10-CM

## 2024-12-04 DIAGNOSIS — I65.23 OCCLUSION AND STENOSIS OF BILATERAL CAROTID ARTERIES: ICD-10-CM

## 2024-12-04 PROCEDURE — 99213 OFFICE O/P EST LOW 20 MIN: CPT | Mod: 25

## 2024-12-04 PROCEDURE — 93880 EXTRACRANIAL BILAT STUDY: CPT

## 2025-01-04 NOTE — HISTORY OF PRESENT ILLNESS
[FreeTextEntry1] : 78 y/o F with PMH of HTN, HLD, hypothyroidism, and h/o bilateral carotid stenosis s/p R CEA (3/2014) and L CEA (4/2016) with two elective left carotid stents (CFA) placed (12/12/2017 and most recently on 9/4/2018). She presents for routine follow up. Patient feels well overall. She has been compliant with Plavix, baby aspirin, and BP meds. She has been staying active around the house. Denies any dizziness, lightheadedness,CVA/ TIA, vision change, or other neurologic symptoms. Patient was borderline T2DM, and has been eating healthier. She will be having a colonoscopy next month for which she will need to stop blood thinner for 5 days, and wonders if it is ok. 
Xray Chest 1 View- PORTABLE-Urgent

## 2025-06-04 ENCOUNTER — APPOINTMENT (OUTPATIENT)
Dept: VASCULAR SURGERY | Facility: CLINIC | Age: 80
End: 2025-06-04
Payer: MEDICARE

## 2025-06-04 ENCOUNTER — NON-APPOINTMENT (OUTPATIENT)
Age: 80
End: 2025-06-04

## 2025-06-04 VITALS
WEIGHT: 124 LBS | HEIGHT: 62 IN | HEART RATE: 73 BPM | DIASTOLIC BLOOD PRESSURE: 73 MMHG | BODY MASS INDEX: 22.82 KG/M2 | SYSTOLIC BLOOD PRESSURE: 153 MMHG

## 2025-06-04 DIAGNOSIS — Z98.890 OTHER SPECIFIED POSTPROCEDURAL STATES: ICD-10-CM

## 2025-06-04 DIAGNOSIS — I65.23 OCCLUSION AND STENOSIS OF BILATERAL CAROTID ARTERIES: ICD-10-CM

## 2025-06-04 DIAGNOSIS — Z95.828 OTHER SPECIFIED POSTPROCEDURAL STATES: ICD-10-CM

## 2025-06-04 PROCEDURE — 93880 EXTRACRANIAL BILAT STUDY: CPT

## 2025-06-04 PROCEDURE — 99213 OFFICE O/P EST LOW 20 MIN: CPT | Mod: 25
